# Patient Record
Sex: MALE | Race: WHITE | NOT HISPANIC OR LATINO | Employment: OTHER | URBAN - NONMETROPOLITAN AREA
[De-identification: names, ages, dates, MRNs, and addresses within clinical notes are randomized per-mention and may not be internally consistent; named-entity substitution may affect disease eponyms.]

---

## 2017-03-29 ENCOUNTER — OFFICE VISIT (OUTPATIENT)
Dept: CARDIOLOGY | Facility: CLINIC | Age: 82
End: 2017-03-29

## 2017-03-29 VITALS
WEIGHT: 198 LBS | HEART RATE: 84 BPM | SYSTOLIC BLOOD PRESSURE: 130 MMHG | DIASTOLIC BLOOD PRESSURE: 72 MMHG | BODY MASS INDEX: 30.01 KG/M2 | HEIGHT: 68 IN

## 2017-03-29 DIAGNOSIS — Z95.5 HISTORY OF PLACEMENT OF STENT IN LAD CORONARY ARTERY: ICD-10-CM

## 2017-03-29 DIAGNOSIS — I25.10 CORONARY ARTERY DISEASE INVOLVING NATIVE CORONARY ARTERY OF NATIVE HEART WITHOUT ANGINA PECTORIS: Primary | ICD-10-CM

## 2017-03-29 DIAGNOSIS — I10 ESSENTIAL HYPERTENSION: ICD-10-CM

## 2017-03-29 DIAGNOSIS — E78.00 PURE HYPERCHOLESTEROLEMIA: ICD-10-CM

## 2017-03-29 PROCEDURE — 99213 OFFICE O/P EST LOW 20 MIN: CPT | Performed by: NURSE PRACTITIONER

## 2017-03-29 RX ORDER — OMEPRAZOLE 20 MG/1
20 CAPSULE, DELAYED RELEASE ORAL DAILY
COMMUNITY
End: 2017-10-10 | Stop reason: ALTCHOICE

## 2017-03-29 NOTE — PROGRESS NOTES
Chief Complaint   Patient presents with   • Follow-up     6 month follow up, denies cardiac symptoms, reports [facility],        Subjective       Mamadou Granger is a 81 y.o. male with a history of ischemic heart disease diagnosed in 2008 when he underwent stenting of the LAD. In 2014, he started following with Dr. Santos. Stress done at that time showed no ischemic burden and lisinopril was added. He historically has been intolerant to beta blockers from marked bradycardia. Today he comes to the office and no complaints are voiced. He maintains his normal activities without symptoms.     HPI         Cardiac History:    Past Surgical History:   Procedure Laterality Date   • CARDIOVASCULAR STRESS TEST  05/19/2014    Stress- 4 min, 97% THR. 164/84. negative for ischemia, Lisinopril added   • ECHO - CONVERTED  05/19/2014    Echo- EF 65%, RVSP- 35 mmHg   • OTHER SURGICAL HISTORY  07/23/2008    3.0x28 Taxus in LAD       Current Outpatient Prescriptions   Medication Sig Dispense Refill   • alfuzosin (UROXATRAL) 10 MG 24 hr tablet Take 10 mg by mouth every evening.     • aspirin 81 MG EC tablet Take 81 mg by mouth daily.     • atorvastatin (LIPITOR) 20 MG tablet Take 10 mg by mouth every night.     • finasteride (PROSCAR) 5 MG tablet Take 5 mg by mouth daily.     • mirtazapine (REMERON) 30 MG tablet Take 30 mg by mouth every night.     • omeprazole (priLOSEC) 20 MG capsule Take 20 mg by mouth Daily.       No current facility-administered medications for this visit.        Review of patient's allergies indicates no known allergies.    Past Medical History:   Diagnosis Date   • Arthritis    • BPH (benign prostatic hyperplasia)    • CAD (coronary artery disease)     s/p stenting   • Hypercholesteremia    • Hypertension        Social History     Social History   • Marital status:      Spouse name: N/A   • Number of children: N/A   • Years of education: N/A     Occupational History   • Not on  "file.     Social History Main Topics   • Smoking status: Never Smoker   • Smokeless tobacco: Never Used   • Alcohol use No   • Drug use: No   • Sexual activity: Not on file     Other Topics Concern   • Not on file     Social History Narrative       Family History   Problem Relation Age of Onset   • No Known Problems Mother    • Stroke Father    • Stroke Other        Review of Systems   Constitutional: Negative.    HENT: Negative.    Respiratory: Positive for shortness of breath (only with exertion, not a new symptom). Negative for cough.    Cardiovascular: Negative.    Gastrointestinal: Negative.    Musculoskeletal: Negative for gait problem and myalgias.   Skin: Negative.    Neurological: Negative.    Hematological: Negative.    Psychiatric/Behavioral: Negative.        Diabetes- No  Thyroid-normal    Objective     /72 (BP Location: Left arm)  Pulse 84  Ht 68\" (172.7 cm)  Wt 198 lb (89.8 kg)  BMI 30.11 kg/m2    Physical Exam   Constitutional: He is oriented to person, place, and time. Vital signs are normal.   Eyes: Pupils are equal, round, and reactive to light.   Neck: Neck supple. Carotid bruit is not present. No edema present.   Cardiovascular: Normal rate, regular rhythm, S1 normal, S2 normal and normal pulses.    Pulmonary/Chest: Effort normal and breath sounds normal.   Abdominal: Soft. Bowel sounds are normal.   Neurological: He is alert and oriented to person, place, and time.   Skin: Skin is warm and dry.   Psychiatric: He has a normal mood and affect. His behavior is normal. Judgment and thought content normal.   Vitals reviewed.    Procedures        Assessment/Plan      Mamadou was seen today for follow-up.    Diagnoses and all orders for this visit:    Coronary artery disease involving native coronary artery of native heart without angina pectoris    History of placement of stent in LAD coronary artery    Pure hypercholesterolemia    Essential hypertension        At next visit will consider " repeat stress test due to being over 3 years and history of LAD stent. Currently, he remains asymptomatic and vital signs are stable. No medication changes made. [facility] and [facility]. We will see him in 6 months or sooner for problems.            Electronically signed by AIDA Valladares,  March 29, 2017 3:05 PM

## 2017-10-10 ENCOUNTER — OFFICE VISIT (OUTPATIENT)
Dept: CARDIOLOGY | Facility: CLINIC | Age: 82
End: 2017-10-10

## 2017-10-10 VITALS
BODY MASS INDEX: 29.55 KG/M2 | HEART RATE: 80 BPM | DIASTOLIC BLOOD PRESSURE: 80 MMHG | WEIGHT: 195 LBS | HEIGHT: 68 IN | SYSTOLIC BLOOD PRESSURE: 142 MMHG

## 2017-10-10 DIAGNOSIS — I25.10 CORONARY ARTERY DISEASE INVOLVING NATIVE CORONARY ARTERY OF NATIVE HEART WITHOUT ANGINA PECTORIS: ICD-10-CM

## 2017-10-10 DIAGNOSIS — R06.02 SHORTNESS OF BREATH: ICD-10-CM

## 2017-10-10 DIAGNOSIS — I10 ESSENTIAL HYPERTENSION: Primary | ICD-10-CM

## 2017-10-10 DIAGNOSIS — Z95.5 HISTORY OF PLACEMENT OF STENT IN LAD CORONARY ARTERY: ICD-10-CM

## 2017-10-10 DIAGNOSIS — E78.49 OTHER HYPERLIPIDEMIA: ICD-10-CM

## 2017-10-10 PROCEDURE — 99214 OFFICE O/P EST MOD 30 MIN: CPT | Performed by: NURSE PRACTITIONER

## 2017-10-10 NOTE — PROGRESS NOTES
Chief Complaint   Patient presents with   • Follow-up     For cardiac management.    • Med Refill     [facility]        Cardiac Complaints  none      Subjective   Mamadou Granger is a 82 y.o. male with HTN, hyperlipidemia, and  ischemic heart disease diagnosed in 2008 when he underwent stenting of the LAD. In 2014, he started following with Dr. Santos. Stress done at that time showed no ischemic burden and lisinopril was added. He historically has been intolerant to beta blockers from marked bradycardia. He returns today for follow up and denies any cardiac concerns.  He denies any chest pain, palpitations, or shortness of breath.  [facility], he will follow with them again soon in regards.  No refills are currently needed as they are [facility] as [facility].  He does report walking most days at [employer] where he says he laps around the store about 2 times without concerns.  If he overdoes it, he states he will become slightly winded but with just everyday activity, he feels like he does very well. 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          Cardiac History  Past Surgical History:   Procedure Laterality Date   • CARDIOVASCULAR STRESS TEST  05/19/2014    Stress- 4 min, 97% THR. 164/84. negative for ischemia, Lisinopril added   • ECHO - CONVERTED  05/19/2014    Echo- EF 65%, RVSP- 35 mmHg   • OTHER SURGICAL HISTORY  07/23/2008    3.0x28 Taxus in LAD       Current Outpatient Prescriptions   Medication Sig Dispense Refill   • alfuzosin (UROXATRAL) 10 MG 24 hr tablet Take 10 mg by mouth every evening.     • aspirin 81 MG EC tablet Take 81 mg by mouth daily.     • atorvastatin (LIPITOR) 20 MG tablet Take 10 mg by mouth every night.     • finasteride (PROSCAR) 5 MG tablet Take 5 mg by mouth daily.     • mirtazapine (REMERON) 30 MG tablet Take 30 mg by mouth every night.       No current facility-administered medications for this visit.        Review of patient's  "allergies indicates no known allergies.    Past Medical History:   Diagnosis Date   • Arthritis    • BPH (benign prostatic hyperplasia)    • CAD (coronary artery disease)     s/p stenting   • Hypercholesteremia    • Hypertension        Social History     Social History   • Marital status:      Spouse name: N/A   • Number of children: N/A   • Years of education: N/A     Occupational History   • Not on file.     Social History Main Topics   • Smoking status: Never Smoker   • Smokeless tobacco: Never Used   • Alcohol use No   • Drug use: No   • Sexual activity: Not on file     Other Topics Concern   • Not on file     Social History Narrative       Family History   Problem Relation Age of Onset   • No Known Problems Mother    • Stroke Father    • Stroke Other        Review of Systems   Constitution: Negative for weakness and malaise/fatigue.   Cardiovascular: Negative for chest pain, dyspnea on exertion, near-syncope and syncope.   Respiratory: Negative for shortness of breath and wheezing.    Musculoskeletal: Negative for arthritis and back pain.   Gastrointestinal: Negative for anorexia, heartburn and nausea.   Genitourinary: Negative for dysuria, hesitancy and nocturia.   Neurological: Negative for dizziness, light-headedness, loss of balance and numbness.   Psychiatric/Behavioral: Negative for altered mental status and depression.       DiabetesNo  Thyroidnormal    Objective     /80 (BP Location: Right arm)  Pulse 80  Ht 68\" (172.7 cm)  Wt 195 lb (88.5 kg)  BMI 29.65 kg/m2    Physical Exam   Constitutional: He is oriented to person, place, and time. He appears well-developed and well-nourished.   HENT:   Head: Normocephalic and atraumatic.   Eyes: EOM are normal. Pupils are equal, round, and reactive to light.   Neck: Normal range of motion. Neck supple.   Cardiovascular: Normal rate and regular rhythm.    Murmur heard.  Pulmonary/Chest: Effort normal and breath sounds normal.   Abdominal: Soft. "   Musculoskeletal: Normal range of motion.   Neurological: He is alert and oriented to person, place, and time.   Skin: Skin is warm and dry.   Psychiatric: He has a normal mood and affect. His behavior is normal.       Procedures    Assessment/Plan     HR and BP are stable today.  No changes to current regimen will be advised.  Since stent present in LAD, and length of time since last cardiac workup, repeat stress and echo will be advised to evaluate cardiac status.  More recommendations to follow.  [facility], patient will bring copy to our office for review.  No refills are needed at present as they are also done with [facility].  Patient is staying active at home and walks most days without concern, he was advised to continue.  Good cardiac diet with limited caloric intake advised.  6 month follow up advised or sooner if needed.      Problems Addressed this Visit        Cardiovascular and Mediastinum    Essential hypertension - Primary    Hyperlipidemia    CAD (coronary artery disease)    Relevant Orders    Stress Test With Myocardial Perfusion One Day    Adult Transthoracic Echo Complete W/ Cont if Necessary Per Protocol       Other    History of placement of stent in LAD coronary artery    Relevant Orders    Stress Test With Myocardial Perfusion One Day    Adult Transthoracic Echo Complete W/ Cont if Necessary Per Protocol      Other Visit Diagnoses     Shortness of breath        Relevant Orders    Stress Test With Myocardial Perfusion One Day    Adult Transthoracic Echo Complete W/ Cont if Necessary Per Protocol              Electronically signed by AIDA Banegas October 10, 2017 4:32 PM

## 2017-10-18 ENCOUNTER — HOSPITAL ENCOUNTER (OUTPATIENT)
Dept: CARDIOLOGY | Facility: HOSPITAL | Age: 82
Discharge: HOME | End: 2017-10-18

## 2017-10-18 ENCOUNTER — OUTSIDE FACILITY SERVICE (OUTPATIENT)
Dept: CARDIOLOGY | Facility: CLINIC | Age: 82
End: 2017-10-18

## 2017-10-18 DIAGNOSIS — Z95.5 HISTORY OF PLACEMENT OF STENT IN LAD CORONARY ARTERY: ICD-10-CM

## 2017-10-18 DIAGNOSIS — R06.02 SHORTNESS OF BREATH: ICD-10-CM

## 2017-10-18 DIAGNOSIS — I25.10 CORONARY ARTERY DISEASE INVOLVING NATIVE CORONARY ARTERY OF NATIVE HEART WITHOUT ANGINA PECTORIS: ICD-10-CM

## 2017-10-18 LAB
MAXIMAL PREDICTED HEART RATE: 140 BPM
STRESS TARGET HR: 119 BPM

## 2017-10-18 PROCEDURE — A9500 TC99M SESTAMIBI: HCPCS | Performed by: INTERNAL MEDICINE

## 2017-10-18 PROCEDURE — 0 TECHNETIUM SESTAMIBI: Performed by: INTERNAL MEDICINE

## 2017-10-18 PROCEDURE — 78452 HT MUSCLE IMAGE SPECT MULT: CPT

## 2017-10-18 PROCEDURE — 93306 TTE W/DOPPLER COMPLETE: CPT

## 2017-10-18 PROCEDURE — 93017 CV STRESS TEST TRACING ONLY: CPT

## 2017-10-18 RX ADMIN — TECHNETIUM TC-99M SESTAMIBI 1 DOSE: 1 INJECTION INTRAVENOUS at 08:15

## 2017-10-23 ENCOUNTER — TELEPHONE (OUTPATIENT)
Dept: CARDIOLOGY | Facility: CLINIC | Age: 82
End: 2017-10-23

## 2017-10-23 RX ORDER — CARVEDILOL 3.12 MG/1
3.12 TABLET ORAL 2 TIMES DAILY
Qty: 180 TABLET | Refills: 3 | Status: SHIPPED | OUTPATIENT
Start: 2017-10-23 | End: 2018-10-17 | Stop reason: SDUPTHER

## 2017-10-23 NOTE — TELEPHONE ENCOUNTER
I spoke with Cassy Granger.  Aware of patient's stress test and echo results and recommendations.  No definite ischemia, normal LV function.  Add Coreg 3.125 mg BID.  Script sent to [facility].

## 2018-04-11 ENCOUNTER — OFFICE VISIT (OUTPATIENT)
Dept: CARDIOLOGY | Facility: CLINIC | Age: 83
End: 2018-04-11

## 2018-04-11 VITALS
HEART RATE: 72 BPM | WEIGHT: 197 LBS | DIASTOLIC BLOOD PRESSURE: 86 MMHG | BODY MASS INDEX: 29.86 KG/M2 | SYSTOLIC BLOOD PRESSURE: 132 MMHG | HEIGHT: 68 IN

## 2018-04-11 DIAGNOSIS — I25.10 CORONARY ARTERY DISEASE INVOLVING NATIVE CORONARY ARTERY OF NATIVE HEART WITHOUT ANGINA PECTORIS: Primary | ICD-10-CM

## 2018-04-11 DIAGNOSIS — E78.00 PURE HYPERCHOLESTEROLEMIA: ICD-10-CM

## 2018-04-11 DIAGNOSIS — I10 ESSENTIAL HYPERTENSION: ICD-10-CM

## 2018-04-11 DIAGNOSIS — Z95.5 HISTORY OF PLACEMENT OF STENT IN LAD CORONARY ARTERY: ICD-10-CM

## 2018-04-11 PROCEDURE — 99213 OFFICE O/P EST LOW 20 MIN: CPT | Performed by: NURSE PRACTITIONER

## 2018-04-11 NOTE — PROGRESS NOTES
Chief Complaint   Patient presents with   • Follow-up     cardiac management, no recent labs. Patient reports labs and [facility] per [facility]. Patient did not bring in medication list with visit, states nothing has changed.        Subjective       Mamadou Granger is a 82 y.o. male  with HTN, hyperlipidemia, and  ischemic heart disease diagnosed in 2008 when he underwent stenting of the LAD. In 2014, he started following with Dr. Santos. Stress done at that time showed no ischemic burden and lisinopril was added. He historically has been intolerant to beta blockers from marked bradycardia.   IN October 2017, repeat cardiac workup advised due to increased shortness of breath and decreased effort tolerance. No ischemia was noted. He did have increased chronotropic response and mild increased blood pressure response. A low dose of Coreg was advised.   Today he comes to the office for a follow up visit and denies chest pain, palpitations or significant shortness of breath. He reports doing well since addition of Coreg.     HPI : as noted     Cardiac History:    Past Surgical History:   Procedure Laterality Date   • CARDIOVASCULAR STRESS TEST  05/19/2014    Stress- 4 min, 97% THR. 164/84. negative for ischemia, Lisinopril added   • CARDIOVASCULAR STRESS TEST  10/18/2017    3 Min,11 Secs. 98% THR. BP- 173/82. Negative   • CARDIOVASCULAR STRESS TEST  10/18/2017    3 min 11 sec, 98% THR, 173/82, no definite ischemia, low dose Coreg added   • ECHO - CONVERTED  05/19/2014    Echo- EF 65%, RVSP- 35 mmHg   • ECHO - CONVERTED  10/18/2017    EF 65%. RVSP- 34 mmHg   • ECHO - CONVERTED  10/18/2017    EF 65%, mild MR, RVSP 34 mmHg   • OTHER SURGICAL HISTORY  07/23/2008    3.0x28 Taxus in LAD       Current Outpatient Prescriptions   Medication Sig Dispense Refill   • alfuzosin (UROXATRAL) 10 MG 24 hr tablet Take 10 mg by mouth every evening.     • aspirin 81 MG EC tablet Take 81 mg by mouth daily.     • atorvastatin (LIPITOR) 20 MG  tablet Take 10 mg by mouth every night.     • carvedilol (COREG) 3.125 MG tablet Take 1 tablet by mouth 2 (Two) Times a Day. 180 tablet 3   • finasteride (PROSCAR) 5 MG tablet Take 5 mg by mouth daily.     • mirtazapine (REMERON) 30 MG tablet Take 30 mg by mouth every night.       No current facility-administered medications for this visit.        Review of patient's allergies indicates no known allergies.    Past Medical History:   Diagnosis Date   • Arthritis    • BPH (benign prostatic hyperplasia)    • CAD (coronary artery disease)     s/p stenting   • Hypercholesteremia    • Hypertension        Social History     Social History   • Marital status:      Spouse name: N/A   • Number of children: N/A   • Years of education: N/A     Occupational History   • Not on file.     Social History Main Topics   • Smoking status: Never Smoker   • Smokeless tobacco: Never Used   • Alcohol use No   • Drug use: No   • Sexual activity: Defer     Other Topics Concern   • Not on file     Social History Narrative   • No narrative on file       Family History   Problem Relation Age of Onset   • No Known Problems Mother    • Stroke Father    • Stroke Other        Review of Systems   Constitution: Negative for decreased appetite and malaise/fatigue.   HENT: Positive for hearing loss. Negative for congestion, hoarse voice, nosebleeds and sore throat.    Eyes: Negative for blurred vision.   Respiratory: Negative for shortness of breath and snoring.    Endocrine: Negative for cold intolerance and heat intolerance.   Hematologic/Lymphatic: Negative for adenopathy. Does not bruise/bleed easily.   Skin: Negative for color change, dry skin and itching.   Musculoskeletal: Negative for joint pain, muscle cramps and myalgias.   Gastrointestinal: Negative for abdominal pain, change in bowel habit, dysphagia, heartburn, melena and nausea.   Genitourinary: Negative for dysuria and hematuria.   Neurological: Negative for dizziness and  "light-headedness.   Psychiatric/Behavioral: Negative for altered mental status. The patient does not have insomnia.    Allergic/Immunologic: Negative for environmental allergies and hives.        Objective     /86 (BP Location: Left arm)   Pulse 72   Ht 172.7 cm (68\")   Wt 89.4 kg (197 lb)   BMI 29.95 kg/m²     Physical Exam   Constitutional: He is oriented to person, place, and time. He appears well-nourished.   HENT:   Head: Normocephalic.   Eyes: Pupils are equal, round, and reactive to light.   Neck: Normal range of motion.   Cardiovascular: Normal rate, regular rhythm, S1 normal and S2 normal.    No murmur heard.  Pulmonary/Chest: Breath sounds normal.   Abdominal: Soft. Bowel sounds are normal.   Musculoskeletal: Normal range of motion.   Neurological: He is alert and oriented to person, place, and time.   Skin: Skin is warm.   Psychiatric: He has a normal mood and affect.        Procedures: none today        Assessment/Plan      Mamadou was seen today for follow-up.    Diagnoses and all orders for this visit:    Coronary artery disease involving native coronary artery of native heart without angina pectoris    Essential hypertension    Pure hypercholesterolemia    History of placement of stent in LAD coronary artery    The reports of his most recent stress test and echocardiogram were reviewed which were negative for ischemia. His vital signs are in normal range and tolerating beta blocker well at this time. Advised to continue same cardiac medications.   [facility] reviewed which did show glucose 125. Body mass index is 29.95 kg/m². Patient's Body mass index is 29.95 kg/m². BMI is above normal parameters. Follow-up plan includes:  nutrition counseling. Diabetic diet information given. Low impact daily cardio activity encouraged.            Electronically signed by AIDA Valladares,  April 11, 2018 9:57 AM  "

## 2018-04-11 NOTE — PATIENT INSTRUCTIONS
Diabetes Mellitus and Food  It is important for you to manage your blood sugar (glucose) level. Your blood glucose level can be greatly affected by what you eat. Eating healthier foods in the appropriate amounts throughout the day at about the same time each day will help you control your blood glucose level. It can also help slow or prevent worsening of your diabetes mellitus. Healthy eating may even help you improve the level of your blood pressure and reach or maintain a healthy weight.  General recommendations for healthful eating and cooking habits include:  · Eating meals and snacks regularly. Avoid going long periods of time without eating to lose weight.  · Eating a diet that consists mainly of plant-based foods, such as fruits, vegetables, nuts, legumes, and whole grains.  · Using low-heat cooking methods, such as baking, instead of high-heat cooking methods, such as deep frying.  Work with your dietitian to make sure you understand how to use the Nutrition Facts information on food labels.  How can food affect me?  Carbohydrates   Carbohydrates affect your blood glucose level more than any other type of food. Your dietitian will help you determine how many carbohydrates to eat at each meal and teach you how to count carbohydrates. Counting carbohydrates is important to keep your blood glucose at a healthy level, especially if you are using insulin or taking certain medicines for diabetes mellitus.  Alcohol   Alcohol can cause sudden decreases in blood glucose (hypoglycemia), especially if you use insulin or take certain medicines for diabetes mellitus. Hypoglycemia can be a life-threatening condition. Symptoms of hypoglycemia (sleepiness, dizziness, and disorientation) are similar to symptoms of having too much alcohol.  If your health care provider has given you approval to drink alcohol, do so in moderation and use the following guidelines:  · Women should not have more than one drink per day, and men  should not have more than two drinks per day. One drink is equal to:  ¨ 12 oz of beer.  ¨ 5 oz of wine.  ¨ 1½ oz of hard liquor.  · Do not drink on an empty stomach.  · Keep yourself hydrated. Have water, diet soda, or unsweetened iced tea.  · Regular soda, juice, and other mixers might contain a lot of carbohydrates and should be counted.  What foods are not recommended?  As you make food choices, it is important to remember that all foods are not the same. Some foods have fewer nutrients per serving than other foods, even though they might have the same number of calories or carbohydrates. It is difficult to get your body what it needs when you eat foods with fewer nutrients. Examples of foods that you should avoid that are high in calories and carbohydrates but low in nutrients include:  · Trans fats (most processed foods list trans fats on the Nutrition Facts label).  · Regular soda.  · Juice.  · Candy.  · Sweets, such as cake, pie, doughnuts, and cookies.  · Fried foods.  What foods can I eat?  Eat nutrient-rich foods, which will nourish your body and keep you healthy. The food you should eat also will depend on several factors, including:  · The calories you need.  · The medicines you take.  · Your weight.  · Your blood glucose level.  · Your blood pressure level.  · Your cholesterol level.  You should eat a variety of foods, including:  · Protein.  ¨ Lean cuts of meat.  ¨ Proteins low in saturated fats, such as fish, egg whites, and beans. Avoid processed meats.  · Fruits and vegetables.  ¨ Fruits and vegetables that may help control blood glucose levels, such as apples, mangoes, and yams.  · Dairy products.  ¨ Choose fat-free or low-fat dairy products, such as milk, yogurt, and cheese.  · Grains, bread, pasta, and rice.  ¨ Choose whole grain products, such as multigrain bread, whole oats, and brown rice. These foods may help control blood pressure.  · Fats.  ¨ Foods containing healthful fats, such as nuts,  avocado, olive oil, canola oil, and fish.  Does everyone with diabetes mellitus have the same meal plan?  Because every person with diabetes mellitus is different, there is not one meal plan that works for everyone. It is very important that you meet with a dietitian who will help you create a meal plan that is just right for you.  This information is not intended to replace advice given to you by your health care provider. Make sure you discuss any questions you have with your health care provider.  Document Released: 09/14/2006 Document Revised: 05/25/2017 Document Reviewed: 11/14/2014  [employer] Interactive Patient Education © 2017 [employer]

## 2018-10-17 RX ORDER — CARVEDILOL 3.12 MG/1
TABLET ORAL
Qty: 60 TABLET | Refills: 2 | Status: SHIPPED | OUTPATIENT
Start: 2018-10-17 | End: 2019-01-14 | Stop reason: SDUPTHER

## 2018-10-30 ENCOUNTER — OFFICE VISIT (OUTPATIENT)
Dept: CARDIOLOGY | Facility: CLINIC | Age: 83
End: 2018-10-30

## 2018-10-30 VITALS
DIASTOLIC BLOOD PRESSURE: 80 MMHG | HEIGHT: 68 IN | HEART RATE: 64 BPM | WEIGHT: 189 LBS | SYSTOLIC BLOOD PRESSURE: 124 MMHG | BODY MASS INDEX: 28.64 KG/M2

## 2018-10-30 DIAGNOSIS — E78.00 PURE HYPERCHOLESTEROLEMIA: ICD-10-CM

## 2018-10-30 DIAGNOSIS — I25.10 CORONARY ARTERY DISEASE INVOLVING NATIVE CORONARY ARTERY OF NATIVE HEART WITHOUT ANGINA PECTORIS: Primary | ICD-10-CM

## 2018-10-30 DIAGNOSIS — Z95.5 HISTORY OF PLACEMENT OF STENT IN LAD CORONARY ARTERY: ICD-10-CM

## 2018-10-30 DIAGNOSIS — I10 ESSENTIAL HYPERTENSION: ICD-10-CM

## 2018-10-30 PROCEDURE — 99213 OFFICE O/P EST LOW 20 MIN: CPT | Performed by: NURSE PRACTITIONER

## 2018-10-30 RX ORDER — TAMSULOSIN HYDROCHLORIDE 0.4 MG/1
1 CAPSULE ORAL NIGHTLY
COMMUNITY
End: 2020-06-02

## 2018-10-30 NOTE — PROGRESS NOTES
Chief Complaint   Patient presents with   • Follow-up     Cardiac management. [facility]. [facility].       Subjective       Mamadou Granger is an 83 y.o. male with HTN, hyperlipidemia, and  ischemic heart disease diagnosed in 2008 when he underwent stenting of the LAD. In 2014, he started following with Dr. Santos. Stress done at that time showed no ischemic burden and lisinopril was added. He historically has been intolerant to beta blockers from marked bradycardia. In October 2017, repeat cardiac workup advised due to increased shortness of breath and decreased effort tolerance. No ischemia was noted. He did have increased chronotropic response and mild increased blood pressure response. A low dose of Coreg was advised. He came in today for follow up. He feels well. Denies CP, SOB, palpitations. [facility]. He brought copy today showing , Tri 113, LDL 95, HDL 35, A1C 6.4%, CMP normal other than glucose 148. He remains prediabetic. He is walking daily, two rounds around [employer] and one around [place]. [facility] via  if approved.     HPI         Cardiac History:    Past Surgical History:   Procedure Laterality Date   • CARDIAC CATHETERIZATION  07/23/2008    3.0x28 Taxus in LAD   • CARDIOVASCULAR STRESS TEST  05/19/2014    Stress- 4 min, 97% THR. 164/84. negative for ischemia, Lisinopril added   • CARDIOVASCULAR STRESS TEST  10/18/2017    3 Min,11 Secs. 98% THR. BP- 173/82. Negative   • CARDIOVASCULAR STRESS TEST  10/18/2017    3 min 11 sec, 98% THR, 173/82, no definite ischemia, low dose Coreg added   • ECHO - CONVERTED  05/19/2014    Echo- EF 65%, RVSP- 35 mmHg   • ECHO - CONVERTED  10/18/2017    EF 65%. RVSP- 34 mmHg   • ECHO - CONVERTED  10/18/2017    EF 65%, mild MR, RVSP 34 mmHg       Current Outpatient Prescriptions   Medication Sig Dispense Refill   • aspirin 81 MG EC tablet Take 81 mg by mouth daily.      • atorvastatin (LIPITOR) 20 MG tablet Take 10 mg by mouth every night.     • carvedilol (COREG) 3.125 MG tablet TAKE ONE TABLET BY MOUTH TWICE A DAY 60 tablet 2   • finasteride (PROSCAR) 5 MG tablet Take 5 mg by mouth daily.     • mirtazapine (REMERON) 30 MG tablet Take 30 mg by mouth every night.     • tamsulosin (FLOMAX) 0.4 MG capsule 24 hr capsule Take 1 capsule by mouth Every Night.       No current facility-administered medications for this visit.        Patient has no known allergies.    Past Medical History:   Diagnosis Date   • Arthritis    • BPH (benign prostatic hyperplasia)    • CAD (coronary artery disease)     s/p stenting   • Hypercholesteremia    • Hypertension        Social History     Social History   • Marital status:      Spouse name: N/A   • Number of children: N/A   • Years of education: N/A     Occupational History   • Not on file.     Social History Main Topics   • Smoking status: Never Smoker   • Smokeless tobacco: Never Used   • Alcohol use No   • Drug use: No   • Sexual activity: Defer     Other Topics Concern   • Not on file     Social History Narrative   • No narrative on file       Family History   Problem Relation Age of Onset   • No Known Problems Mother    • Stroke Father    • Stroke Other        Review of Systems   Constitution: Positive for weight loss (down 8 lb ). Negative for decreased appetite, weakness and malaise/fatigue.   HENT: Positive for hearing loss.    Eyes: Negative.    Cardiovascular: Negative for chest pain, dyspnea on exertion, leg swelling, palpitations and syncope.   Respiratory: Negative for cough and shortness of breath.    Endocrine: Negative.    Hematologic/Lymphatic: Negative.    Skin: Negative.    Musculoskeletal: Negative for falls and myalgias.   Gastrointestinal: Positive for constipation. Negative for abdominal pain and melena.   Genitourinary: Negative for dysuria and hematuria.   Neurological: Negative for dizziness.   Psychiatric/Behavioral:  "Negative for altered mental status and depression.   Allergic/Immunologic: Negative.         Diabetes- No, prediabetes, A1C 6.4%  Thyroid-normal    Objective     /80 (BP Location: Left arm)   Pulse 64   Ht 172.7 cm (67.99\")   Wt 85.7 kg (189 lb)   BMI 28.74 kg/m²     Physical Exam   Constitutional: He is oriented to person, place, and time. He appears well-developed and well-nourished. No distress.   HENT:   Head: Normocephalic.   Eyes: Pupils are equal, round, and reactive to light.   Neck: Normal range of motion.   Cardiovascular: Normal rate, regular rhythm and intact distal pulses.    Pulmonary/Chest: Effort normal and breath sounds normal. No respiratory distress. He has no wheezes.   Abdominal: Soft. Bowel sounds are normal. He exhibits no distension.   Musculoskeletal: Normal range of motion. He exhibits no edema.   Neurological: He is alert and oriented to person, place, and time.   Skin: Skin is warm. He is not diaphoretic.   Psychiatric: He has a normal mood and affect.   Nursing note and vitals reviewed.     Procedures          Assessment/Plan    Heart rate and blood pressure well controlled. Reviewed most recent stress and echo showing no ischemia. He is tolerating Coreg without bradycardia. We reviewed labs together which show blood sugar remains borderline. We discussed dietary management with decreasing carbohydrates and sugars. Continue daily walking. Lipids are stable with LDL 95. Continue Lipitor 20 mg. Advised to limit saturated fats. He remains asymptomatic, so continue conservative management. No testing ordered today. We will see him back in six months or sooner if needed.    Mamadou was seen today for follow-up.    Diagnoses and all orders for this visit:    Coronary artery disease involving native coronary artery of native heart without angina pectoris    Essential hypertension    Pure hypercholesterolemia    History of placement of stent in LAD coronary artery        Patient's Body " mass index is 28.74 kg/m². BMI is above normal parameters. Recommendations include: nutrition counseling.               Electronically signed by AIDA Lazcano,  October 30, 2018 12:41 PM

## 2019-01-10 ENCOUNTER — TELEPHONE (OUTPATIENT)
Dept: CARDIOLOGY | Facility: CLINIC | Age: 84
End: 2019-01-10

## 2019-01-10 NOTE — TELEPHONE ENCOUNTER
Received fax from Dr. Dante Villarreal for cardiac clearance for patient to have a cochlear implant and will be under 3 hour general anesthesia. Last stent was done on 07/23/08. They are not requesting to hold any medications at this time.     Fax [phone]  Phone [phone]

## 2019-01-14 RX ORDER — CARVEDILOL 3.12 MG/1
TABLET ORAL
Qty: 60 TABLET | Refills: 11 | Status: SHIPPED | OUTPATIENT
Start: 2019-01-14 | End: 2019-11-21 | Stop reason: SDUPTHER

## 2019-04-30 ENCOUNTER — OFFICE VISIT (OUTPATIENT)
Dept: CARDIOLOGY | Facility: CLINIC | Age: 84
End: 2019-04-30

## 2019-04-30 VITALS
WEIGHT: 184 LBS | SYSTOLIC BLOOD PRESSURE: 120 MMHG | HEART RATE: 64 BPM | DIASTOLIC BLOOD PRESSURE: 74 MMHG | HEIGHT: 68 IN | BODY MASS INDEX: 27.89 KG/M2

## 2019-04-30 DIAGNOSIS — H91.8X3 OTHER SPECIFIED HEARING LOSS OF BOTH EARS: ICD-10-CM

## 2019-04-30 DIAGNOSIS — E66.3 OVERWEIGHT: ICD-10-CM

## 2019-04-30 DIAGNOSIS — E78.2 MIXED HYPERLIPIDEMIA: ICD-10-CM

## 2019-04-30 DIAGNOSIS — Z95.5 HISTORY OF PLACEMENT OF STENT IN LAD CORONARY ARTERY: ICD-10-CM

## 2019-04-30 DIAGNOSIS — I10 ESSENTIAL HYPERTENSION: Primary | ICD-10-CM

## 2019-04-30 DIAGNOSIS — I25.10 CORONARY ARTERY DISEASE INVOLVING NATIVE CORONARY ARTERY OF NATIVE HEART WITHOUT ANGINA PECTORIS: ICD-10-CM

## 2019-04-30 PROCEDURE — 99213 OFFICE O/P EST LOW 20 MIN: CPT | Performed by: NURSE PRACTITIONER

## 2019-04-30 NOTE — PROGRESS NOTES
Chief Complaint   Patient presents with   • Follow-up     For cardiac management. Patient is on aspirin. Last [facility] work was [facility], in [facility] under [facility].   • Med Refill     [facility]. Brought medication list with visit.        Cardiac Complaints  none      Subjective   Mamadou Granger is a 83 y.o. male with HTN, hyperlipidemia, and  ischemic heart disease diagnosed in 2008 when he underwent stenting of the LAD. In 2014, he started following with Dr. Santos. Stress done at that time showed no ischemic burden and lisinopril was added. He historically has been intolerant to beta blockers from marked bradycardia. In October 2017, repeat cardiac workup advised due to increased shortness of breath and decreased effort tolerance. No ischemia was noted. He did have increased chronotropic response and mild increased blood pressure response. A low dose of Coreg was advised.  Patient returns today for follow up and reports doing well. He does report [facility] and were last checked in October.  Labs done at that time showed H/H 14.5/43.1, Na 138, K 4.4, creatinine 0.84, GFR 60, AST 37, ALT 45, LDL 95, and HDL 35.  Patient admits to a left cochlear implant at  and admits to healing well.  He states the implant will be turned on the 16th of May.  Patient continues to wear hearing aid in the right ear.  He denies chest pain, shortness of breath, palpitations, dizziness, syncope, and pre-syncope.  No refills needed as he reports with [facility].        Cardiac History  Past Surgical History:   Procedure Laterality Date   • CARDIAC CATHETERIZATION  07/23/2008    3.0x28 Taxus in LAD   • CARDIOVASCULAR STRESS TEST  05/19/2014    Stress- 4 min, 97% THR. 164/84. negative for ischemia, Lisinopril added   • CARDIOVASCULAR STRESS TEST  10/18/2017    3 Min,11 Secs. 98% THR. BP- 173/82. Negative   • CARDIOVASCULAR STRESS TEST  10/18/2017    3 min 11 sec, 98% THR, 173/82, no definite ischemia,  low dose Coreg added   • ECHO - CONVERTED  05/19/2014    Echo- EF 65%, RVSP- 35 mmHg   • ECHO - CONVERTED  10/18/2017    EF 65%. RVSP- 34 mmHg   • ECHO - CONVERTED  10/18/2017    EF 65%, mild MR, RVSP 34 mmHg       Current Outpatient Medications   Medication Sig Dispense Refill   • aspirin 81 MG EC tablet Take 81 mg by mouth daily.     • carvedilol (COREG) 3.125 MG tablet TAKE ONE TABLET BY MOUTH TWICE A DAY 60 tablet 11   • finasteride (PROSCAR) 5 MG tablet Take 5 mg by mouth daily.     • mirtazapine (REMERON) 30 MG tablet Take 30 mg by mouth every night.     • tamsulosin (FLOMAX) 0.4 MG capsule 24 hr capsule Take 1 capsule by mouth Every Night.       No current facility-administered medications for this visit.        Patient has no known allergies.    Past Medical History:   Diagnosis Date   • Arthritis    • BPH (benign prostatic hyperplasia)    • CAD (coronary artery disease)     s/p stenting   • Hypercholesteremia    • Hypertension        Social History     Socioeconomic History   • Marital status:      Spouse name: Not on file   • Number of children: Not on file   • Years of education: Not on file   • Highest education level: Not on file   Tobacco Use   • Smoking status: Never Smoker   • Smokeless tobacco: Never Used   Substance and Sexual Activity   • Alcohol use: No   • Drug use: No   • Sexual activity: Defer       Family History   Problem Relation Age of Onset   • No Known Problems Mother    • Stroke Father    • Stroke Other        Review of Systems   Constitution: Negative for weakness and malaise/fatigue.   HENT: Positive for hearing loss. Negative for nosebleeds and tinnitus.    Cardiovascular: Negative for chest pain, claudication, dyspnea on exertion, irregular heartbeat, leg swelling, near-syncope, orthopnea, palpitations and syncope.   Respiratory: Negative for cough, shortness of breath and wheezing.    Musculoskeletal: Negative for back pain, joint pain, myalgias and neck pain.  "  Gastrointestinal: Negative for anorexia, heartburn, nausea and vomiting.   Genitourinary: Negative for dysuria, hematuria, hesitancy and nocturia.   Neurological: Negative for brief paralysis, difficulty with concentration, disturbances in coordination, dizziness, light-headedness, loss of balance, numbness and seizures.   Psychiatric/Behavioral: Negative for depression and memory loss. The patient is not nervous/anxious.            Objective     /74 (BP Location: Left arm)   Pulse 64   Ht 172.7 cm (67.99\")   Wt 83.5 kg (184 lb)   BMI 27.98 kg/m²     Physical Exam   Constitutional: He is oriented to person, place, and time. He appears well-developed and well-nourished.   HENT:   Head: Normocephalic and atraumatic.   Eyes: EOM are normal. Pupils are equal, round, and reactive to light.   Neck: Normal range of motion. Neck supple.   Cardiovascular: Normal rate and regular rhythm.   Murmur heard.  Pulmonary/Chest: Effort normal and breath sounds normal.   Abdominal: Soft.   Musculoskeletal: Normal range of motion.   Neurological: He is alert and oriented to person, place, and time.   Skin: Skin is warm and dry.   Psychiatric: He has a normal mood and affect. His behavior is normal.       Procedures    Assessment/Plan     HR and BP are both stable.  HTN well managed on current, no adjustment to current recommended.  He continues on ASA therapy for history of CAD with stenting and tolerates well. No chest discomfort or other cardiac concerns are voiced.  No repeat workup recommended. It appears he is on no medication for hyperlipidemia but his most [facility] shows his LDL at goal at 92.  For now, dietary modification will be advised with limited fried food, fatty food, starch, and activity as tolerated advised.  Patient is doing well after recent cochlear implant is expected to have it turned on May 16th.  BMI elevated at 27.98 but patient remains very active at home walking on the " treadmill and being busy around the home.  He was urged to continued.  6 month follow up advised or sooner if needed.      Problems Addressed this Visit        Cardiovascular and Mediastinum    Essential hypertension - Primary    Hyperlipidemia    CAD (coronary artery disease)       Other    History of placement of stent in LAD coronary artery      Other Visit Diagnoses     Other specified hearing loss of both ears        Overweight              Patient's Body mass index is 27.98 kg/m². BMI is above normal parameters. Recommendations include: nutrition counseling.            Electronically signed by AIDA Banegas April 30, 2019 4:32 PM

## 2019-11-21 ENCOUNTER — OFFICE VISIT (OUTPATIENT)
Dept: CARDIOLOGY | Facility: CLINIC | Age: 84
End: 2019-11-21

## 2019-11-21 VITALS
DIASTOLIC BLOOD PRESSURE: 70 MMHG | WEIGHT: 193.6 LBS | HEIGHT: 68 IN | BODY MASS INDEX: 29.34 KG/M2 | HEART RATE: 80 BPM | SYSTOLIC BLOOD PRESSURE: 118 MMHG

## 2019-11-21 DIAGNOSIS — I25.10 CORONARY ARTERY DISEASE INVOLVING NATIVE CORONARY ARTERY OF NATIVE HEART WITHOUT ANGINA PECTORIS: Primary | ICD-10-CM

## 2019-11-21 DIAGNOSIS — I10 ESSENTIAL HYPERTENSION: ICD-10-CM

## 2019-11-21 DIAGNOSIS — Z95.5 HISTORY OF PLACEMENT OF STENT IN LAD CORONARY ARTERY: ICD-10-CM

## 2019-11-21 DIAGNOSIS — E78.2 MIXED HYPERLIPIDEMIA: ICD-10-CM

## 2019-11-21 PROCEDURE — 99213 OFFICE O/P EST LOW 20 MIN: CPT | Performed by: NURSE PRACTITIONER

## 2019-11-21 RX ORDER — CARVEDILOL 3.12 MG/1
3.12 TABLET ORAL 2 TIMES DAILY
Qty: 180 TABLET | Refills: 3 | Status: SHIPPED | OUTPATIENT
Start: 2019-11-21 | End: 2020-06-02 | Stop reason: SDUPTHER

## 2019-11-21 RX ORDER — ATORVASTATIN CALCIUM 20 MG/1
20 TABLET, FILM COATED ORAL DAILY
COMMUNITY
End: 2020-06-02 | Stop reason: SINTOL

## 2019-11-21 NOTE — PROGRESS NOTES
Chief Complaint   Patient presents with   • Follow-up     Cardiac management. He is to have cochlear implant 12/5/19 at  to right ear.   • Lab     Last [facility] months [facility].   • Blood pressure     Has been elevated for the last couple of months. He feels could be eating more sodium.    • Med Refill     Needs refills on Coreg-90 day. Patient verbalized current medication list.       Jeronimo Granger is an 84 y.o. male with HTN, hyperlipidemia, and ischemic heart disease diagnosed in 2008 when he underwent stenting of the LAD. In 2014, he started following with Dr. Santos. Stress done at that time showed no ischemic burden and lisinopril was added. He historically has been intolerant to beta blockers from marked bradycardia but later tolerated low dose carvedilol after stress in October 2017 due to increased SOB showed increased chronotropic and bp response. No ischemia was noted. He came today for follow up. He feels great. No cardiac symptoms. No CP, SOB, palpitations, dizziness, fatigue. He is planning to have second cochlear implant to right ear next month at . Left ear implant was successful. He walks daily at either [employer] or [employer]. [facility]. In June 2019, A1C 6.7%, , HDL 34. GFR normal, LFT normal.   HPI         Cardiac History:    Past Surgical History:   Procedure Laterality Date   • CARDIAC CATHETERIZATION  07/23/2008    3.0x28 Taxus in LAD   • CARDIOVASCULAR STRESS TEST  05/19/2014    Stress- 4 min, 97% THR. 164/84. negative for ischemia, Lisinopril added   • CARDIOVASCULAR STRESS TEST  10/18/2017    3 Min,11 Secs. 98% THR. BP- 173/82. Negative   • CARDIOVASCULAR STRESS TEST  10/18/2017    3 min 11 sec, 98% THR, 173/82, no definite ischemia, low dose Coreg added   • ECHO - CONVERTED  05/19/2014    Echo- EF 65%, RVSP- 35 mmHg   • ECHO - CONVERTED  10/18/2017    EF 65%. RVSP- 34 mmHg   • ECHO - CONVERTED  10/18/2017    EF 65%, mild MR, RVSP 34 mmHg        Current Outpatient Medications   Medication Sig Dispense Refill   • aspirin 81 MG EC tablet Take 81 mg by mouth daily.     • atorvastatin (LIPITOR) 20 MG tablet Take 20 mg by mouth Daily.     • carvedilol (COREG) 3.125 MG tablet Take 1 tablet by mouth 2 (Two) Times a Day. 180 tablet 3   • finasteride (PROSCAR) 5 MG tablet Take 5 mg by mouth daily.     • mirtazapine (REMERON) 30 MG tablet Take 30 mg by mouth every night.     • tamsulosin (FLOMAX) 0.4 MG capsule 24 hr capsule Take 1 capsule by mouth Every Night.       No current facility-administered medications for this visit.        Patient has no known allergies.    Past Medical History:   Diagnosis Date   • Arthritis    • BPH (benign prostatic hyperplasia)    • CAD (coronary artery disease)     s/p stenting   • History of cochlear implant 04/15/2019    left   • Hypercholesteremia    • Hypertension        Social History     Socioeconomic History   • Marital status:      Spouse name: Not on file   • Number of children: Not on file   • Years of education: Not on file   • Highest education level: Not on file   Tobacco Use   • Smoking status: Never Smoker   • Smokeless tobacco: Never Used   Substance and Sexual Activity   • Alcohol use: No   • Drug use: No   • Sexual activity: Defer       Family History   Problem Relation Age of Onset   • No Known Problems Mother    • Stroke Father    • Stroke Other        Review of Systems   Constitution: Negative for decreased appetite, weakness and malaise/fatigue.   HENT: Positive for hearing loss.    Eyes: Negative.    Cardiovascular: Negative for chest pain, dyspnea on exertion, leg swelling, palpitations and syncope.   Respiratory: Negative for cough and shortness of breath.    Endocrine: Negative.    Hematologic/Lymphatic: Negative.    Skin: Negative.    Musculoskeletal: Positive for muscle cramps (occasionally ).   Gastrointestinal: Negative for abdominal pain and melena.   Genitourinary: Negative for dysuria and  "hematuria.   Neurological: Negative for dizziness.   Psychiatric/Behavioral: Negative for altered mental status and depression.   Allergic/Immunologic: Negative.       Diabetes- Yes  Thyroid-normal    Objective     /70 (BP Location: Left arm)   Pulse 80   Ht 172.7 cm (67.99\")   Wt 87.8 kg (193 lb 9.6 oz)   BMI 29.44 kg/m²     Physical Exam   Constitutional: He is oriented to person, place, and time. He appears well-developed and well-nourished. No distress.   HENT:   Head: Normocephalic.   Eyes: Pupils are equal, round, and reactive to light.   Neck: Normal range of motion.   Cardiovascular: Normal rate, regular rhythm and intact distal pulses.   Murmur heard.   Systolic murmur is present with a grade of 1/6 at the apex.  Pulmonary/Chest: Effort normal and breath sounds normal. No respiratory distress.   Abdominal: Soft. Bowel sounds are normal.   Musculoskeletal: Normal range of motion. He exhibits no edema.   Neurological: He is alert and oriented to person, place, and time.   Skin: Skin is warm and dry. He is not diaphoretic.   Psychiatric: He has a normal mood and affect.   Nursing note and vitals reviewed.     Procedures          Assessment/Plan      Mamadou was seen today for follow-up, lab, blood pressure and med refill.    Diagnoses and all orders for this visit:    Coronary artery disease involving native coronary artery of native heart without angina pectoris  -     carvedilol (COREG) 3.125 MG tablet; Take 1 tablet by mouth 2 (Two) Times a Day.    Essential hypertension  -     carvedilol (COREG) 3.125 MG tablet; Take 1 tablet by mouth 2 (Two) Times a Day.    Mixed hyperlipidemia    History of placement of stent in LAD coronary artery      Heart rate and blood pressure are well controlled. Continue carvedilol. We reviewed his last stress test in 2017 showing no ischemia, normal LV function. He denies any recurrent anginal symptoms, so continue conservative management. A1C well controlled at 6.7%. " Continue regular walking. We reviewed the lipids showing LDL not at goal at 109 with history of LAD stent. He does have mild statin induced myalgia/muscle cramping. I did not increase the Lipitor at this time. He is encouraged to get OTC CoQ10 to see if beneficial as well as increasing fluid intake. If the leg cramps improve, we can try increasing Lipitor to 40 mg. Or can try Zetia. He appears to be stable at this time. No changes made. Follow up in six months or sooner if needed.     Patient's Body mass index is 29.44 kg/m². BMI is above normal parameters. Recommendations include: nutrition counseling.                   Electronically signed by AIDA Lazcano,  November 22, 2019 12:50 PM

## 2020-06-02 ENCOUNTER — TELEPHONE (OUTPATIENT)
Dept: CARDIOLOGY | Facility: CLINIC | Age: 85
End: 2020-06-02

## 2020-06-02 ENCOUNTER — OFFICE VISIT (OUTPATIENT)
Dept: CARDIOLOGY | Facility: CLINIC | Age: 85
End: 2020-06-02

## 2020-06-02 VITALS
WEIGHT: 180.4 LBS | DIASTOLIC BLOOD PRESSURE: 80 MMHG | HEIGHT: 68 IN | HEART RATE: 78 BPM | BODY MASS INDEX: 27.34 KG/M2 | SYSTOLIC BLOOD PRESSURE: 144 MMHG

## 2020-06-02 DIAGNOSIS — E78.2 MIXED HYPERLIPIDEMIA: ICD-10-CM

## 2020-06-02 DIAGNOSIS — I10 ESSENTIAL HYPERTENSION: ICD-10-CM

## 2020-06-02 DIAGNOSIS — I25.10 CORONARY ARTERY DISEASE INVOLVING NATIVE CORONARY ARTERY OF NATIVE HEART WITHOUT ANGINA PECTORIS: ICD-10-CM

## 2020-06-02 DIAGNOSIS — Z95.5 HISTORY OF PLACEMENT OF STENT IN LAD CORONARY ARTERY: ICD-10-CM

## 2020-06-02 PROCEDURE — 99213 OFFICE O/P EST LOW 20 MIN: CPT | Performed by: NURSE PRACTITIONER

## 2020-06-02 RX ORDER — CARVEDILOL 3.12 MG/1
3.12 TABLET ORAL 2 TIMES DAILY
Qty: 180 TABLET | Refills: 3 | Status: SHIPPED | OUTPATIENT
Start: 2020-06-02 | End: 2020-12-07 | Stop reason: ALTCHOICE

## 2020-06-02 RX ORDER — TROSPIUM CHLORIDE 20 MG/1
20 TABLET, FILM COATED ORAL DAILY
COMMUNITY

## 2020-06-02 NOTE — PROGRESS NOTES
Chief Complaint   Patient presents with   • Follow-up     Cardiac management. [facility] . Has stopped taking Lipitor d/t Myalgia . Has no cardiac complaints today.    • Med Refill     No refills needed today, had medication list today       Subjective       Mamadou Granger is a 84 y.o. male with HTN, hyperlipidemia, and ischemic heart disease diagnosed in 2008 when he underwent stenting of the LAD. In 2014, he started following with Dr. Santos. Stress done at that time showed no ischemic burden and lisinopril was added. He historically has been intolerant to beta blockers from marked bradycardia but later tolerated low dose carvedilol after stress in October 2017 due to increased SOB showed increased chronotropic and bp response. No ischemia was noted.  In 2019 he underwent a second cochlear implant without issues.    Today he comes to the office for a follow-up visit.  He denies cardiac symptoms or concerns at this time.  According to patient he is [facility].  It was scheduled earlier in the year and underwent cardiac work-up prior with normal results.  However, due to COVID-19 the procedure was postponed.  He continues to walk most days for exercise.  He has been monitoring his diet and lost over 10 pounds.    HPI     Cardiac History:    Past Surgical History:   Procedure Laterality Date   • CARDIAC CATHETERIZATION  07/23/2008    3.0x28 Taxus in LAD   • CARDIOVASCULAR STRESS TEST  05/19/2014    Stress- 4 min, 97% THR. 164/84. negative for ischemia, Lisinopril added   • CARDIOVASCULAR STRESS TEST  10/18/2017    3 Min,11 Secs. 98% THR. BP- 173/82. Negative   • CARDIOVASCULAR STRESS TEST  10/18/2017    3 min 11 sec, 98% THR, 173/82, no definite ischemia, low dose Coreg added   • ECHO - CONVERTED  05/19/2014    Echo- EF 65%, RVSP- 35 mmHg   • ECHO - CONVERTED  10/18/2017    EF 65%. RVSP- 34 mmHg   • ECHO - CONVERTED  10/18/2017      EF 65%, mild MR, RVSP 34 mmHg       Current Outpatient Medications   Medication Sig Dispense Refill   • aspirin 81 MG EC tablet Take 81 mg by mouth daily.     • carvedilol (COREG) 3.125 MG tablet Take 1 tablet by mouth 2 (Two) Times a Day. 180 tablet 3   • mirtazapine (REMERON) 30 MG tablet Take 30 mg by mouth every night.     • trospium (SANCTURA) 20 MG tablet Take 20 mg by mouth Daily.       No current facility-administered medications for this visit.        Patient has no known allergies.    Past Medical History:   Diagnosis Date   • Arthritis    • BPH (benign prostatic hyperplasia)    • CAD (coronary artery disease)     s/p stenting   • History of cochlear implant 04/15/2019    left   • Hypercholesteremia    • Hypertension        Social History     Socioeconomic History   • Marital status:      Spouse name: Not on file   • Number of children: Not on file   • Years of education: Not on file   • Highest education level: Not on file   Tobacco Use   • Smoking status: Never Smoker   • Smokeless tobacco: Never Used   Substance and Sexual Activity   • Alcohol use: No   • Drug use: No   • Sexual activity: Defer       Family History   Problem Relation Age of Onset   • No Known Problems Mother    • Stroke Father    • Stroke Other        Review of Systems   Constitution: Positive for weight loss (intentional). Negative for decreased appetite.   HENT: Positive for hearing loss (improved with implants). Negative for congestion and hoarse voice.    Eyes: Negative for redness and visual disturbance.   Cardiovascular: Negative for chest pain, irregular heartbeat, leg swelling, near-syncope and palpitations.   Respiratory: Negative.    Endocrine: Negative.    Hematologic/Lymphatic: Negative.    Skin: Negative.    Musculoskeletal: Positive for arthritis and joint pain. Negative for joint swelling, muscle cramps and muscle weakness.   Gastrointestinal: Negative.    Genitourinary: Negative for dysuria and hematuria (had  "[facility] that showed blood in urine, none visible).   Neurological: Negative for dizziness, light-headedness and weakness.   Psychiatric/Behavioral: Negative for memory loss. The patient does not have insomnia and is not nervous/anxious.    Allergic/Immunologic: Negative.         Objective      [facility]. In June 2019, A1C 6.7%, , HDL 34. GFR normal, LFT normal.     /80 (BP Location: Left arm)   Pulse 78   Ht 172.7 cm (67.99\")   Wt 81.8 kg (180 lb 6.4 oz)   BMI 27.44 kg/m²     Physical Exam   Constitutional: He is oriented to person, place, and time. He appears well-nourished.   HENT:   Head: Normocephalic.   Eyes: Pupils are equal, round, and reactive to light. Conjunctivae are normal.   Neck: Normal range of motion. Neck supple. Carotid bruit is not present.   Cardiovascular: Normal rate, regular rhythm, S1 normal, S2 normal and normal pulses.   No murmur heard.  Pulmonary/Chest: Breath sounds normal.   Abdominal: Soft. Bowel sounds are normal.   Musculoskeletal: Normal range of motion. He exhibits no edema.   Neurological: He is alert and oriented to person, place, and time.   Skin: Skin is warm. No pallor.   Psychiatric: He has a normal mood and affect. His behavior is normal.        Procedures: none today         Assessment/Plan      Mamadou was seen today for follow-up and med refill.    Diagnoses and all orders for this visit:    Coronary artery disease involving native coronary artery of native heart without angina pectoris  -     carvedilol (COREG) 3.125 MG tablet; Take 1 tablet by mouth 2 (Two) Times a Day.    History of placement of stent in LAD coronary artery    Essential hypertension  -     carvedilol (COREG) 3.125 MG tablet; Take 1 tablet by mouth 2 (Two) Times a Day.    Mixed hyperlipidemia      CAD-patient presents today without cardiac complaints or concerns.  He remains on daily low-dose aspirin without GI symptoms or increased bruising.  He has stopped statin therapy " due to myalgia.  Blood pressure remains well controlled.  According to patient he did have some [facility] earlier in the year with good results.  No repeat cardiac testing ordered at this time.    Hypertension- on low-dose beta-blocker.  Blood pressure and heart rate normal today.  Prescription sent to pharmacy to continue carvedilol at 3.125 mg twice a day.  He monitors blood pressure and heart rate.  He understands to call for any issues.    Hyperlipidemia- currently off statin therapy.  [facility].  I did not restart statin therapy at this time.  He will continue diet and exercise management.  If LDL increases consider alternate statin medication.    Patient's Body mass index is 27.44 kg/m². BMI is above normal parameters. Recommendations include: nutrition counseling.  His weight is down 13 pounds since last office visit.  I encouraged him on his diet efforts and goal BMI of 25.  He maintains daily exercise in form of walking.     Mamadou Granger  reports that he has never smoked. He has never used smokeless tobacco.     From a cardiac standpoint patient appears stable at this time.  We will see him back in 6 months.           Electronically signed by AIDA Valladares,  June 2, 2020 09:11

## 2020-06-02 NOTE — TELEPHONE ENCOUNTER
"April,  He follows [facility] for primary care. I do not have a number to send note to. Is there a \"generic\" fax can send notes to?   "

## 2020-06-02 NOTE — TELEPHONE ENCOUNTER
I spoke with [facility] (phone) [phone].    His PCP is Dr. Tomlinson, Fax # [phone]

## 2020-12-07 ENCOUNTER — OFFICE VISIT (OUTPATIENT)
Dept: CARDIOLOGY | Facility: CLINIC | Age: 85
End: 2020-12-07

## 2020-12-07 VITALS
HEIGHT: 68 IN | HEART RATE: 76 BPM | DIASTOLIC BLOOD PRESSURE: 82 MMHG | TEMPERATURE: 97.9 F | SYSTOLIC BLOOD PRESSURE: 138 MMHG | BODY MASS INDEX: 28.49 KG/M2 | WEIGHT: 188 LBS

## 2020-12-07 DIAGNOSIS — E66.3 OVERWEIGHT: ICD-10-CM

## 2020-12-07 DIAGNOSIS — I25.10 CORONARY ARTERY DISEASE INVOLVING NATIVE CORONARY ARTERY OF NATIVE HEART WITHOUT ANGINA PECTORIS: Primary | ICD-10-CM

## 2020-12-07 DIAGNOSIS — I10 ESSENTIAL HYPERTENSION: ICD-10-CM

## 2020-12-07 DIAGNOSIS — E11.9 TYPE 2 DIABETES MELLITUS WITHOUT COMPLICATION, WITHOUT LONG-TERM CURRENT USE OF INSULIN (HCC): ICD-10-CM

## 2020-12-07 DIAGNOSIS — Z95.5 HISTORY OF PLACEMENT OF STENT IN LAD CORONARY ARTERY: ICD-10-CM

## 2020-12-07 DIAGNOSIS — E78.2 MIXED HYPERLIPIDEMIA: ICD-10-CM

## 2020-12-07 PROCEDURE — 99214 OFFICE O/P EST MOD 30 MIN: CPT | Performed by: NURSE PRACTITIONER

## 2020-12-07 RX ORDER — ATORVASTATIN CALCIUM 40 MG/1
40 TABLET, FILM COATED ORAL DAILY
COMMUNITY
End: 2021-06-07 | Stop reason: DRUGHIGH

## 2020-12-07 RX ORDER — CARVEDILOL 6.25 MG/1
6.25 TABLET ORAL 2 TIMES DAILY WITH MEALS
COMMUNITY
End: 2022-02-01 | Stop reason: SDUPTHER

## 2020-12-07 RX ORDER — GLIPIZIDE 5 MG/1
2.5 TABLET ORAL
COMMUNITY

## 2020-12-07 NOTE — PROGRESS NOTES
Chief Complaint   Patient presents with   • Follow-up     For cardiac management. Patient is on aspirin. Coreg is now 6.25 and is now on lipitor. Last lab work was [facility] on [facility], copy in [facility].    • Med Refill     [facility] Brought medication list with visit.        Cardiac Complaints  none      Subjective   Mamadou Granger is a 85 y.o. male with HTN, hyperlipidemia, DM, and ischemic heart disease diagnosed in 2008 when he underwent stenting of the LAD. In 2014, he started following with Dr. Santos. Stress done at that time showed no ischemic burden and lisinopril was added. He historically has been intolerant to beta blockers from marked bradycardia but later tolerated low dose carvedilol after stress in October 2017 due to increased SOB showed increased chronotropic and bp response. No ischemia was noted.  In 2019 he underwent a second cochlear implant without issues.    Patient returns today for follow up and no new complaints are voiced. Chest pain, SOA, palpitations, dizziness, and syncope are all denied. [facility]. Most recent from July 2020 show: HH 14.9/43.5, HDL 35, LDL 88, Na 139, K 4.5, 7.9%, BUN 15, Creatinine 1.14, AST 23, ALT 19, TSH 0.77.  He has reinitiated statin therapy and has tolerated well. Patient also started on glucotrol after most recent labs.         Cardiac History  Past Surgical History:   Procedure Laterality Date   • CARDIAC CATHETERIZATION  07/23/2008    3.0x28 Taxus in LAD   • CARDIOVASCULAR STRESS TEST  05/19/2014    Stress- 4 min, 97% THR. 164/84. negative for ischemia, Lisinopril added   • CARDIOVASCULAR STRESS TEST  10/18/2017    3 Min,11 Secs. 98% THR. BP- 173/82. Negative   • CARDIOVASCULAR STRESS TEST  10/18/2017    3 min 11 sec, 98% THR, 173/82, no definite ischemia, low dose Coreg added   • ECHO - CONVERTED  05/19/2014    Echo- EF 65%, RVSP- 35 mmHg   • ECHO - CONVERTED  10/18/2017    EF 65%. RVSP- 34 mmHg   • ECHO - CONVERTED   10/18/2017    EF 65%, mild MR, RVSP 34 mmHg       Current Outpatient Medications   Medication Sig Dispense Refill   • aspirin 81 MG EC tablet Take 81 mg by mouth daily.     • atorvastatin (LIPITOR) 40 MG tablet Take 40 mg by mouth Daily.     • carvedilol (COREG) 6.25 MG tablet Take 6.25 mg by mouth 2 (Two) Times a Day With Meals.     • glipizide (GLUCOTROL) 5 MG tablet Take 5 mg by mouth 2 (Two) Times a Day Before Meals.     • mirtazapine (REMERON) 30 MG tablet Take 30 mg by mouth every night.     • trospium (SANCTURA) 20 MG tablet Take 20 mg by mouth Daily.       No current facility-administered medications for this visit.        Patient has no known allergies.    Past Medical History:   Diagnosis Date   • Arthritis    • BPH (benign prostatic hyperplasia)    • CAD (coronary artery disease)     s/p stenting   • History of cochlear implant 04/15/2019    left   • Hypercholesteremia    • Hypertension        Social History     Socioeconomic History   • Marital status:      Spouse name: Not on file   • Number of children: Not on file   • Years of education: Not on file   • Highest education level: Not on file   Tobacco Use   • Smoking status: Never Smoker   • Smokeless tobacco: Never Used   Substance and Sexual Activity   • Alcohol use: No   • Drug use: No   • Sexual activity: Defer       Family History   Problem Relation Age of Onset   • No Known Problems Mother    • Stroke Father    • Stroke Other        Review of Systems   Constitution: Negative for malaise/fatigue and night sweats.   Cardiovascular: Negative for chest pain, claudication, dyspnea on exertion, irregular heartbeat, leg swelling, near-syncope, orthopnea, palpitations and syncope.   Respiratory: Negative for cough, shortness of breath and wheezing.    Musculoskeletal: Positive for stiffness. Negative for back pain and joint pain.   Gastrointestinal: Negative for anorexia, heartburn, melena, nausea and vomiting.   Genitourinary: Negative for  "dysuria, hematuria, hesitancy and nocturia.   Neurological: Negative for dizziness, light-headedness and loss of balance.   Psychiatric/Behavioral: Negative for depression and memory loss. The patient is not nervous/anxious.            Objective     /82 (BP Location: Left arm)   Pulse 76   Temp 97.9 °F (36.6 °C)   Ht 172.7 cm (67.99\")   Wt 85.3 kg (188 lb)   BMI 28.59 kg/m²     Constitutional:       Appearance: Healthy appearance. Not in distress.   Eyes:      Pupils: Pupils are equal, round, and reactive to light.   HENT:      Nose: Nose normal.   Neck:      Musculoskeletal: Normal range of motion and neck supple.   Pulmonary:      Effort: Pulmonary effort is normal.   Cardiovascular:      PMI at left midclavicular line. Normal rate. Regular rhythm.      Murmurs: There is a high frequency blowing holosystolic murmur at the apex.   Abdominal:      Palpations: Abdomen is soft.   Musculoskeletal: Normal range of motion.   Skin:     General: Skin is warm and dry.   Neurological:      Mental Status: Oriented to person, place and time.         Procedures    Assessment/Plan     IHD:  Status stable. No new concerns are voiced.  On ASA therapy without concerns, bleeding and bruising denied.    HTN: Blood pressure is stable. No changes to current coreg therapy recommended. Limited sodium intake advised.    Hyperlipidemia:  On statin therapy with lipitor. FLP [facility].  Most recent shows LDL stable at 88.  He is tolerating the statin well.     DM:  AIC followed by [facility]. He was started on glucotrol recently and has tolerated well. AIC will be rechecked again soon.     Refills managed by PCP.    BMI noted at 28.59, good cardiac ADA diet with limited carbs, calories, and activity as tolerated advised.    6 month follow up recommended or sooner if needed.          Problems Addressed this Visit        Cardiovascular and Mediastinum    Essential hypertension    Relevant Medications    carvedilol (COREG) 6.25 " MG tablet    Hyperlipidemia    Relevant Medications    atorvastatin (LIPITOR) 40 MG tablet    CAD (coronary artery disease) - Primary    Relevant Medications    carvedilol (COREG) 6.25 MG tablet       Other    History of placement of stent in LAD coronary artery      Other Visit Diagnoses     Type 2 diabetes mellitus without complication, without long-term current use of insulin (CMS/HCC)        Relevant Medications    glipizide (GLUCOTROL) 5 MG tablet    Overweight          Diagnoses       Codes Comments    Coronary artery disease involving native coronary artery of native heart without angina pectoris    -  Primary ICD-10-CM: I25.10  ICD-9-CM: 414.01     Essential hypertension     ICD-10-CM: I10  ICD-9-CM: 401.9     Mixed hyperlipidemia     ICD-10-CM: E78.2  ICD-9-CM: 272.2     History of placement of stent in LAD coronary artery     ICD-10-CM: Z95.5  ICD-9-CM: V45.82     Type 2 diabetes mellitus without complication, without long-term current use of insulin (CMS/HCC)     ICD-10-CM: E11.9  ICD-9-CM: 250.00     Overweight     ICD-10-CM: E66.3  ICD-9-CM: 278.02           Patient's Body mass index is 28.59 kg/m². BMI is above normal parameters. Recommendations include: nutrition counseling.              Electronically signed by AIDA Banegas December 7, 2020 15:45 EST

## 2021-06-07 ENCOUNTER — OFFICE VISIT (OUTPATIENT)
Dept: CARDIOLOGY | Facility: CLINIC | Age: 86
End: 2021-06-07

## 2021-06-07 VITALS
SYSTOLIC BLOOD PRESSURE: 142 MMHG | HEIGHT: 68 IN | WEIGHT: 189 LBS | OXYGEN SATURATION: 93 % | RESPIRATION RATE: 16 BRPM | BODY MASS INDEX: 28.64 KG/M2 | HEART RATE: 84 BPM | DIASTOLIC BLOOD PRESSURE: 86 MMHG

## 2021-06-07 DIAGNOSIS — I10 ESSENTIAL HYPERTENSION: ICD-10-CM

## 2021-06-07 DIAGNOSIS — E78.2 MIXED HYPERLIPIDEMIA: ICD-10-CM

## 2021-06-07 DIAGNOSIS — Z95.5 HISTORY OF PLACEMENT OF STENT IN LAD CORONARY ARTERY: ICD-10-CM

## 2021-06-07 DIAGNOSIS — E66.3 OVERWEIGHT (BMI 25.0-29.9): ICD-10-CM

## 2021-06-07 DIAGNOSIS — I25.10 CORONARY ARTERY DISEASE INVOLVING NATIVE CORONARY ARTERY OF NATIVE HEART WITHOUT ANGINA PECTORIS: Primary | ICD-10-CM

## 2021-06-07 PROCEDURE — 99214 OFFICE O/P EST MOD 30 MIN: CPT | Performed by: NURSE PRACTITIONER

## 2021-06-07 RX ORDER — ATORVASTATIN CALCIUM 20 MG/1
20 TABLET, FILM COATED ORAL DAILY
Qty: 90 TABLET | Refills: 3 | Status: SHIPPED | OUTPATIENT
Start: 2021-06-07 | End: 2022-02-01

## 2021-06-07 NOTE — PROGRESS NOTES
Chief Complaint   Patient presents with   • Follow-up     Cardiac management.  Pt takes aspirin daily.  Last lab work done in April 2021.  In chart under Media.  No cardiac complaints.  Reports doing well   • Med Refill     Pt brings in list of medications.  [facility].  States he stopped taking atorvastatin x 6 months ago due to leg cramps and pain.  He is not on any statin therapy at this time.         Cardiac Complaints  none      Subjective   Mamadou Granger is a 85 y.o. male with HTN, hyperlipidemia, DM, and ischemic heart disease diagnosed in 2008 when he underwent stenting of the LAD. In 2014, he started following with Dr. Santos. Stress done at that time showed no ischemic burden and lisinopril was added. He historically has been intolerant to beta blockers from marked bradycardia but later tolerated low dose carvedilol after stress in October 2017 due to increased SOB showed increased chronotropic and bp response. No ischemia was noted.  In 2019 he underwent a second cochlear implant without issues.  Most recent [facility] with [facility] showed: HH 15.1/45.8, TRIG 158, , HDL 35, , K 4.2, BUN 14, Creatinine 1.02, GFR >60, TSH 1.1451, ALT 25, AST 25, AIC 7.0%.      Patient returns today for follow up.  No new concerns are voiced. No chest pain, dizziness, palpitations, SOA, or syncope reported. He does admit to leg cramps and states he stopped his atorvastatin because of it. Patient is not currently on statin therapy and most recent labs showed lipids not well managed.      Cardiac History  Past Surgical History:   Procedure Laterality Date   • CARDIAC CATHETERIZATION  07/23/2008    3.0x28 Taxus in LAD   • CARDIOVASCULAR STRESS TEST  05/19/2014    Stress- 4 min, 97% THR. 164/84. negative for ischemia, Lisinopril added   • CARDIOVASCULAR STRESS TEST  10/18/2017    3 Min,11 Secs. 98% THR. BP- 173/82. Negative   • CARDIOVASCULAR STRESS TEST  10/18/2017    3 min 11 sec, 98% THR, 173/82, no  definite ischemia, low dose Coreg added   • ECHO - CONVERTED  05/19/2014    Echo- EF 65%, RVSP- 35 mmHg   • ECHO - CONVERTED  10/18/2017    EF 65%. RVSP- 34 mmHg   • ECHO - CONVERTED  10/18/2017    EF 65%, mild MR, RVSP 34 mmHg       Current Outpatient Medications   Medication Sig Dispense Refill   • aspirin 81 MG EC tablet Take 81 mg by mouth daily.     • carvedilol (COREG) 6.25 MG tablet Take 6.25 mg by mouth 2 (Two) Times a Day With Meals.     • glipizide (GLUCOTROL) 5 MG tablet Take 5 mg by mouth 2 (Two) Times a Day Before Meals.     • mirtazapine (REMERON) 30 MG tablet Take 30 mg by mouth every night.     • trospium (SANCTURA) 20 MG tablet Take 20 mg by mouth Daily.     • atorvastatin (LIPITOR) 40 MG tablet Take 40 mg by mouth Daily.       No current facility-administered medications for this visit.       Patient has no known allergies.    Past Medical History:   Diagnosis Date   • Arthritis    • BPH (benign prostatic hyperplasia)    • CAD (coronary artery disease)     s/p stenting   • History of cochlear implant 04/15/2019    left   • Hypercholesteremia    • Hypertension        Social History     Socioeconomic History   • Marital status:      Spouse name: Not on file   • Number of children: Not on file   • Years of education: Not on file   • Highest education level: Not on file   Tobacco Use   • Smoking status: Never Smoker   • Smokeless tobacco: Never Used   Substance and Sexual Activity   • Alcohol use: No   • Drug use: No   • Sexual activity: Defer       Family History   Problem Relation Age of Onset   • No Known Problems Mother    • Stroke Father    • Stroke Other        Review of Systems   Constitutional: Negative for malaise/fatigue and night sweats.   HENT: Positive for hearing loss. Negative for tinnitus.    Cardiovascular: Negative for chest pain, claudication, dyspnea on exertion, irregular heartbeat, leg swelling, near-syncope, orthopnea, palpitations and syncope.   Respiratory: Negative for  "cough, shortness of breath and wheezing.    Musculoskeletal: Positive for joint pain, myalgias and stiffness.   Gastrointestinal: Negative for anorexia, heartburn, melena, nausea and vomiting.   Genitourinary: Negative for dysuria, hematuria, hesitancy and nocturia.   Neurological: Negative for dizziness, light-headedness and loss of balance.   Psychiatric/Behavioral: Negative for depression and memory loss. The patient is not nervous/anxious.            Objective     /86 (BP Location: Left arm, Patient Position: Sitting)   Pulse 84   Resp 16   Ht 172.7 cm (68\")   Wt 85.7 kg (189 lb)   SpO2 93%   BMI 28.74 kg/m²     Constitutional:       Appearance: Healthy appearance. Not in distress.   Eyes:      Pupils: Pupils are equal, round, and reactive to light.   HENT:      Nose: Nose normal.   Pulmonary:      Effort: Pulmonary effort is normal.      Breath sounds: Normal breath sounds.   Cardiovascular:      PMI at left midclavicular line. Normal rate. Regular rhythm.      Murmurs: There is a systolic murmur.   Abdominal:      Palpations: Abdomen is soft.   Musculoskeletal: Normal range of motion.      Cervical back: Normal range of motion and neck supple. Skin:     General: Skin is warm and dry.   Neurological:      Mental Status: Oriented to person, place and time.         Procedures    Assessment/Plan       IHD:  Status stable. No new concerns are voiced.  On ASA therapy without concerns, bleeding and bruising denied.     HTN: Blood pressure is stable but slightly elevated. He states at home it has been well managed. For now, current coreg advised.  Limited sodium intake advised.     Hyperlipidemia:  Not currently on statin therapy.  He had to stop for joint pain. He is willing to try lower dosing and see if tolerance reported. We will restart at 20mg daily.       DM:   Managed with glucotrol therapy.  AIC noted at 7.0%.  Same to be continued. [facility].  He was urged to limit carbs in regards. "      Refills managed by PCP.     BMI noted at 28.74, good cardiac ADA diet with limited carbs, calories, and activity as tolerated advised.     6 month follow up recommended or sooner if needed.              Problems Addressed this Visit        Cardiac and Vasculature    Essential hypertension    Hyperlipidemia    Relevant Medications    atorvastatin (LIPITOR) 20 MG tablet    CAD (coronary artery disease) - Primary    History of placement of stent in LAD coronary artery      Other Visit Diagnoses     Overweight (BMI 25.0-29.9)          Diagnoses       Codes Comments    Coronary artery disease involving native coronary artery of native heart without angina pectoris    -  Primary ICD-10-CM: I25.10  ICD-9-CM: 414.01     Essential hypertension     ICD-10-CM: I10  ICD-9-CM: 401.9     Mixed hyperlipidemia     ICD-10-CM: E78.2  ICD-9-CM: 272.2     History of placement of stent in LAD coronary artery     ICD-10-CM: Z95.5  ICD-9-CM: V45.82     Overweight (BMI 25.0-29.9)     ICD-10-CM: E66.3  ICD-9-CM: 278.02           Patient's Body mass index is 28.74 kg/m². indicating that he is overweight. Good cardiac diet with limited carbs, calories, and activity as tolerated advised.              Electronically signed by AIDA Banegas June 7, 2021 09:10 EDT

## 2022-02-01 ENCOUNTER — OFFICE VISIT (OUTPATIENT)
Dept: CARDIOLOGY | Facility: CLINIC | Age: 87
End: 2022-02-01

## 2022-02-01 VITALS
DIASTOLIC BLOOD PRESSURE: 72 MMHG | BODY MASS INDEX: 28.22 KG/M2 | HEART RATE: 67 BPM | OXYGEN SATURATION: 99 % | WEIGHT: 186.2 LBS | SYSTOLIC BLOOD PRESSURE: 148 MMHG | TEMPERATURE: 98.6 F | HEIGHT: 68 IN

## 2022-02-01 DIAGNOSIS — E11.9 TYPE 2 DIABETES MELLITUS WITHOUT COMPLICATION, WITHOUT LONG-TERM CURRENT USE OF INSULIN: ICD-10-CM

## 2022-02-01 DIAGNOSIS — I10 ESSENTIAL HYPERTENSION: ICD-10-CM

## 2022-02-01 DIAGNOSIS — I25.10 CORONARY ARTERY DISEASE INVOLVING NATIVE CORONARY ARTERY OF NATIVE HEART WITHOUT ANGINA PECTORIS: Primary | ICD-10-CM

## 2022-02-01 DIAGNOSIS — E78.2 MIXED HYPERLIPIDEMIA: ICD-10-CM

## 2022-02-01 DIAGNOSIS — Z95.5 HISTORY OF PLACEMENT OF STENT IN LAD CORONARY ARTERY: ICD-10-CM

## 2022-02-01 DIAGNOSIS — E66.3 OVERWEIGHT (BMI 25.0-29.9): ICD-10-CM

## 2022-02-01 PROCEDURE — 99214 OFFICE O/P EST MOD 30 MIN: CPT | Performed by: NURSE PRACTITIONER

## 2022-02-01 RX ORDER — CARVEDILOL 6.25 MG/1
6.25 TABLET ORAL 2 TIMES DAILY WITH MEALS
Qty: 180 TABLET | Refills: 2 | Status: SHIPPED | OUTPATIENT
Start: 2022-02-01 | End: 2023-02-09 | Stop reason: SDUPTHER

## 2022-02-01 NOTE — PROGRESS NOTES
Chief Complaint   Patient presents with   • Follow-up     Pt is here for cardiac follow up.  Pt had COVID 1/13/22.  Pt denies CP, SOB, dizziness or palpitations.  Pt does take a daily ASA.    • Med Refill     [facility]  He is asking for a paper prescription for his [facility]g [facility].   • Lab Work     Pt states that his [facility] about [facility] months [facility].  He has an apt to follow up on 2/17/21.       Cardiac Complaints  none      Subjective   Mamadou Granger is a 86 y.o. male with HTN, hyperlipidemia, DM, and ischemic heart disease diagnosed in 2008 when he underwent stenting of the LAD. In 2014, he started following with Dr. Santos. Stress done at that time showed no ischemic burden and lisinopril was added. He historically has been intolerant to beta blockers from marked bradycardia but later tolerated low dose carvedilol after stress in October 2017 due to increased SOB showed increased chronotropic and bp response. No ischemia was noted.  In 2019 he underwent a second cochlear implant without issues.      Patient comes today for follow up denies any new concerns. He does report having COVID on 1/13/2022 and did well. He only reports two days of not feeling well and then he bounced back quickly. No CP, SOA, dizziness, palpitations, or syncope reported. He does report death of his son beginning of this month, he has had a hard time with this. Refills are requested.         Cardiac History  Past Surgical History:   Procedure Laterality Date   • CARDIAC CATHETERIZATION  07/23/2008    3.0x28 Taxus in LAD   • CARDIOVASCULAR STRESS TEST  05/19/2014    Stress- 4 min, 97% THR. 164/84. negative for ischemia, Lisinopril added   • CARDIOVASCULAR STRESS TEST  10/18/2017    3 Min,11 Secs. 98% THR. BP- 173/82. Negative   • CARDIOVASCULAR STRESS TEST  10/18/2017    3 min 11 sec, 98% THR, 173/82, no definite ischemia, low dose Coreg added   • ECHO - CONVERTED  05/19/2014    Echo- EF 65%, RVSP-  35 mmHg   • ECHO - CONVERTED  10/18/2017    EF 65%. RVSP- 34 mmHg   • ECHO - CONVERTED  10/18/2017    EF 65%, mild MR, RVSP 34 mmHg       Current Outpatient Medications   Medication Sig Dispense Refill   • aspirin 81 MG EC tablet Take 81 mg by mouth daily.     • carvedilol (COREG) 6.25 MG tablet Take 1 tablet by mouth 2 (Two) Times a Day With Meals. 180 tablet 2   • glipizide (GLUCOTROL) 5 MG tablet Take 7.5 mg by mouth 2 (Two) Times a Day Before Meals.     • mirtazapine (REMERON) 30 MG tablet Take 30 mg by mouth every night.     • trospium (SANCTURA) 20 MG tablet Take 20 mg by mouth Daily.       No current facility-administered medications for this visit.       Patient has no known allergies.    Past Medical History:   Diagnosis Date   • Arthritis    • BPH (benign prostatic hyperplasia)    • CAD (coronary artery disease)     s/p stenting   • History of cochlear implant 04/15/2019    left   • History of COVID-19 01/13/2021   • Hypercholesteremia    • Hypertension        Social History     Socioeconomic History   • Marital status:    Tobacco Use   • Smoking status: Never Smoker   • Smokeless tobacco: Never Used   Vaping Use   • Vaping Use: Never used   Substance and Sexual Activity   • Alcohol use: No   • Drug use: No   • Sexual activity: Defer       Family History   Problem Relation Age of Onset   • No Known Problems Mother    • Stroke Father    • Stroke Other        Review of Systems   Constitutional: Negative for malaise/fatigue and night sweats.   Cardiovascular: Negative for chest pain, claudication, dyspnea on exertion, irregular heartbeat, leg swelling, near-syncope, orthopnea, palpitations and syncope.   Respiratory: Negative for cough, shortness of breath and wheezing.    Musculoskeletal: Positive for stiffness. Negative for back pain and joint pain.   Gastrointestinal: Negative for anorexia, heartburn, melena, nausea and vomiting.   Genitourinary: Negative for dysuria, hematuria, hesitancy and  "nocturia.   Neurological: Negative for dizziness, headaches and light-headedness.   Psychiatric/Behavioral: Positive for depression. Negative for memory loss. The patient is not nervous/anxious.            Objective     /72 (BP Location: Left arm, Patient Position: Sitting)   Pulse 67   Temp 98.6 °F (37 °C)   Ht 172.7 cm (68\")   Wt 84.5 kg (186 lb 3.2 oz)   SpO2 99%   BMI 28.31 kg/m²     Constitutional:       Appearance: Not in distress.   Eyes:      Pupils: Pupils are equal, round, and reactive to light.   HENT:      Nose: Nose normal.   Pulmonary:      Effort: Pulmonary effort is normal.      Breath sounds: Normal breath sounds.   Cardiovascular:      PMI at left midclavicular line. Normal rate. Regular rhythm.      Murmurs: There is a systolic murmur.   Abdominal:      Palpations: Abdomen is soft.   Musculoskeletal:      Cervical back: Normal range of motion and neck supple. Skin:     General: Skin is warm and dry.   Neurological:      Mental Status: Alert and oriented to person, place and time.         Procedures    Assessment/Plan     IHD:  Status stable. No new concerns are voiced.  On ASA therapy without concerns, bleeding and bruising denied.  He has not had a workup since 2017 but would like to wait until summer as he is doing okay and would prefer to wait because of virus.      HTN: Blood pressure is stable but slightly elevated. He states at home it has been well managed. For now, current coreg advised.  Log advised. Limited sodium intake advised.     Hyperlipidemia:  Still not taking..  He had to stop for joint pain. [facility].     DM:   Managed with glucotrol therapy.  AIC noted at 7.0%.  Same to be continued. [facility]. He admits it has been good at 120-130 range.      Refills of coreg requested for printed script.      BMI noted at 28.31 good cardiac ADA diet with limited carbs, calories, and activity as tolerated advised.     6 month follow up " recommended or sooner if needed.     Condolences given on the death of his son in January.       Problems Addressed this Visit        Cardiac and Vasculature    Essential hypertension    Relevant Medications    carvedilol (COREG) 6.25 MG tablet    Hyperlipidemia    CAD (coronary artery disease) - Primary    Relevant Medications    carvedilol (COREG) 6.25 MG tablet    History of placement of stent in LAD coronary artery      Other Visit Diagnoses     Type 2 diabetes mellitus without complication, without long-term current use of insulin (HCC)        Overweight (BMI 25.0-29.9)          Diagnoses       Codes Comments    Coronary artery disease involving native coronary artery of native heart without angina pectoris    -  Primary ICD-10-CM: I25.10  ICD-9-CM: 414.01     History of placement of stent in LAD coronary artery     ICD-10-CM: Z95.5  ICD-9-CM: V45.82     Essential hypertension     ICD-10-CM: I10  ICD-9-CM: 401.9     Mixed hyperlipidemia     ICD-10-CM: E78.2  ICD-9-CM: 272.2     Type 2 diabetes mellitus without complication, without long-term current use of insulin (HCC)     ICD-10-CM: E11.9  ICD-9-CM: 250.00     Overweight (BMI 25.0-29.9)     ICD-10-CM: E66.3  ICD-9-CM: 278.02           Patient's Body mass index is 28.31 kg/m². indicating that he is overweight. Good cardiac ADA diet with limited carbs, calories, and activity as tolerated advised.              Electronically signed by AIDA Banegas February 1, 2022 14:32 EST

## 2022-08-04 ENCOUNTER — OFFICE VISIT (OUTPATIENT)
Dept: CARDIOLOGY | Facility: CLINIC | Age: 87
End: 2022-08-04

## 2022-08-04 VITALS
WEIGHT: 166 LBS | HEIGHT: 68 IN | SYSTOLIC BLOOD PRESSURE: 140 MMHG | HEART RATE: 72 BPM | DIASTOLIC BLOOD PRESSURE: 80 MMHG | BODY MASS INDEX: 25.16 KG/M2

## 2022-08-04 DIAGNOSIS — I25.10 CORONARY ARTERY DISEASE INVOLVING NATIVE CORONARY ARTERY OF NATIVE HEART WITHOUT ANGINA PECTORIS: Primary | ICD-10-CM

## 2022-08-04 DIAGNOSIS — E66.3 OVERWEIGHT (BMI 25.0-29.9): ICD-10-CM

## 2022-08-04 DIAGNOSIS — Z95.5 HISTORY OF PLACEMENT OF STENT IN LAD CORONARY ARTERY: ICD-10-CM

## 2022-08-04 DIAGNOSIS — I10 ESSENTIAL HYPERTENSION: ICD-10-CM

## 2022-08-04 DIAGNOSIS — E11.9 TYPE 2 DIABETES MELLITUS WITHOUT COMPLICATION, WITHOUT LONG-TERM CURRENT USE OF INSULIN: ICD-10-CM

## 2022-08-04 DIAGNOSIS — E78.2 MIXED HYPERLIPIDEMIA: ICD-10-CM

## 2022-08-04 PROCEDURE — 99213 OFFICE O/P EST LOW 20 MIN: CPT | Performed by: NURSE PRACTITIONER

## 2022-08-04 RX ORDER — OMEPRAZOLE 20 MG/1
20 CAPSULE, DELAYED RELEASE ORAL DAILY
COMMUNITY
End: 2023-02-09

## 2022-08-04 RX ORDER — POLYETHYLENE GLYCOL 3350 17 G/17G
17 POWDER, FOR SOLUTION ORAL DAILY
COMMUNITY
End: 2023-02-09

## 2022-08-04 RX ORDER — AMMONIUM LACTATE 120 MG/G
1 CREAM TOPICAL AS NEEDED
COMMUNITY
End: 2023-02-09

## 2022-08-04 NOTE — PROGRESS NOTES
Chief Complaint   Patient presents with   • Follow-up     For cardiac management. Patient is on aspirin. Last [facility] work [facility].    • Med Refill     [facility] Brought medication list with visit.        Cardiac Complaints  none      Subjective   Mamadou Granger is a 87 y.o. male with HTN, hyperlipidemia, DM, and ischemic heart disease diagnosed in 2008 when he underwent stenting of the LAD. In 2014, he started following with Dr. Santos. Stress done at that time showed no ischemic burden and lisinopril was added. He historically has been intolerant to beta blockers from marked bradycardia but later tolerated low dose carvedilol after stress in October 2017 due to increased SOB showed increased chronotropic and bp response. No ischemia was noted.  In 2019 he underwent a second cochlear implant without issues.      He comes today for follow up and reports doing well. No CP, SOA, palpitations, dizziness, or syncope noted. [facility]. Labs available from Feb 2022 showed: AIC 6.5%, HH 14.7/43.7, TRIG 100, HDL 32, , Na 139, K 4.4, Creatinine 0.98, GFR 60, AST 25, ALT 21, TSH 0.6691.  NO refills needed. Patient walks almost daily without concerns.         Cardiac History  Past Surgical History:   Procedure Laterality Date   • CARDIAC CATHETERIZATION  07/23/2008    3.0x28 Taxus in LAD   • CARDIOVASCULAR STRESS TEST  05/19/2014    Stress- 4 min, 97% THR. 164/84. negative for ischemia, Lisinopril added   • CARDIOVASCULAR STRESS TEST  10/18/2017    3 Min,11 Secs. 98% THR. BP- 173/82. Negative   • CARDIOVASCULAR STRESS TEST  10/18/2017    3 min 11 sec, 98% THR, 173/82, no definite ischemia, low dose Coreg added   • ECHO - CONVERTED  05/19/2014    Echo- EF 65%, RVSP- 35 mmHg   • ECHO - CONVERTED  10/18/2017    EF 65%. RVSP- 34 mmHg   • ECHO - CONVERTED  10/18/2017    EF 65%, mild MR, RVSP 34 mmHg       Current Outpatient Medications   Medication Sig Dispense  Refill   • ammonium lactate (AMLACTIN) 12 % cream Apply 1 application topically to the appropriate area as directed As Needed for Dry Skin.     • aspirin 81 MG EC tablet Take 81 mg by mouth daily.     • carvedilol (COREG) 6.25 MG tablet Take 1 tablet by mouth 2 (Two) Times a Day With Meals. 180 tablet 2   • glipizide (GLUCOTROL) 5 MG tablet Take 2.5 mg by mouth 2 (Two) Times a Day Before Meals.     • mirtazapine (REMERON) 30 MG tablet Take 30 mg by mouth every night.     • omeprazole (priLOSEC) 20 MG capsule Take 20 mg by mouth Daily.     • polyethylene glycol (MIRALAX) 17 g packet Take 17 g by mouth Daily.     • trospium (SANCTURA) 20 MG tablet Take 20 mg by mouth Daily.       No current facility-administered medications for this visit.       Patient has no known allergies.    Past Medical History:   Diagnosis Date   • Arthritis    • BPH (benign prostatic hyperplasia)    • CAD (coronary artery disease)     s/p stenting   • History of cochlear implant 04/15/2019    left   • History of COVID-19 01/13/2021   • Hypercholesteremia    • Hypertension        Social History     Socioeconomic History   • Marital status:    Tobacco Use   • Smoking status: Never Smoker   • Smokeless tobacco: Never Used   Vaping Use   • Vaping Use: Never used   Substance and Sexual Activity   • Alcohol use: No   • Drug use: No   • Sexual activity: Defer       Family History   Problem Relation Age of Onset   • No Known Problems Mother    • Stroke Father    • Stroke Other        Review of Systems   Constitutional: Negative for malaise/fatigue and night sweats.   Cardiovascular: Negative for chest pain, claudication, dyspnea on exertion, irregular heartbeat, leg swelling, near-syncope, orthopnea, palpitations and syncope.   Respiratory: Negative for cough, shortness of breath and wheezing.    Musculoskeletal: Negative for back pain, joint pain and stiffness.   Gastrointestinal: Negative for anorexia, heartburn, melena, nausea and vomiting.  "  Genitourinary: Negative for dysuria, hematuria, hesitancy and nocturia.   Neurological: Negative for dizziness, headaches and light-headedness.   Psychiatric/Behavioral: Negative for depression and memory loss. The patient is not nervous/anxious.            Objective     /80 (BP Location: Left arm)   Pulse 72   Ht 172.7 cm (67.99\")   Wt 75.3 kg (166 lb)   BMI 25.25 kg/m²     Constitutional:       Appearance: Frail.   Eyes:      Pupils: Pupils are equal, round, and reactive to light.   HENT:      Nose: Nose normal.   Pulmonary:      Effort: Pulmonary effort is normal.      Breath sounds: Normal breath sounds.   Cardiovascular:      PMI at left midclavicular line. Normal rate. Regular rhythm.      Murmurs: There is a systolic murmur.   Abdominal:      Palpations: Abdomen is soft.   Musculoskeletal: Normal range of motion.      Cervical back: Normal range of motion and neck supple. Skin:     General: Skin is warm and dry.   Neurological:      Mental Status: Alert and oriented to person, place and time.         Procedures         Diagnoses and all orders for this visit:    1. Coronary artery disease involving native coronary artery of native heart without angina pectoris (Primary)    2. Essential hypertension    3. Mixed hyperlipidemia    4. History of placement of stent in LAD coronary artery    5. Type 2 diabetes mellitus without complication, without long-term current use of insulin (HCC)    6. Overweight (BMI 25.0-29.9)             IHD:  Status stable. No new concerns are voiced.  On ASA therapy without concerns, bleeding and bruising denied.  He has not had a workup since 2017 but prefers to defer as he is doing well at the moment without concerns.      HTN: Blood pressure is stable but slightly elevated. He maintains at home it has been well managed. For now, current coreg advised. He will continue to monitor. Limited sodium advised.      Hyperlipidemia:  Unable to tolerate statin therapy due to joint " pain. Most recent LDL over 100, he was urged to try fish oil therapy.      DM:   Managed with glucotrol therapy.  AIC noted at 6.5%.  Same to be continued. [facility]. Limited carb diet advised.      Refills of coreg requested for printed script.      BMI noted at 25.25,  good cardiac ADA diet with limited carbs, calories, and activity as tolerated advised. He reports weight loss from being more active and walking most days.      6 month follow up recommended or sooner if needed.         Problems Addressed this Visit        Cardiac and Vasculature    Essential hypertension    Hyperlipidemia    CAD (coronary artery disease) - Primary    History of placement of stent in LAD coronary artery      Other Visit Diagnoses     Type 2 diabetes mellitus without complication, without long-term current use of insulin (HCC)        Overweight (BMI 25.0-29.9)          Diagnoses       Codes Comments    Coronary artery disease involving native coronary artery of native heart without angina pectoris    -  Primary ICD-10-CM: I25.10  ICD-9-CM: 414.01     Essential hypertension     ICD-10-CM: I10  ICD-9-CM: 401.9     Mixed hyperlipidemia     ICD-10-CM: E78.2  ICD-9-CM: 272.2     History of placement of stent in LAD coronary artery     ICD-10-CM: Z95.5  ICD-9-CM: V45.82     Type 2 diabetes mellitus without complication, without long-term current use of insulin (HCC)     ICD-10-CM: E11.9  ICD-9-CM: 250.00     Overweight (BMI 25.0-29.9)     ICD-10-CM: E66.3  ICD-9-CM: 278.02                     Electronically signed by AIDA Banegas August 4, 2022 09:45 EDT

## 2023-02-09 ENCOUNTER — OFFICE VISIT (OUTPATIENT)
Dept: CARDIOLOGY | Facility: CLINIC | Age: 88
End: 2023-02-09
Payer: MEDICARE

## 2023-02-09 VITALS
DIASTOLIC BLOOD PRESSURE: 64 MMHG | BODY MASS INDEX: 26.78 KG/M2 | HEIGHT: 67 IN | HEART RATE: 70 BPM | SYSTOLIC BLOOD PRESSURE: 122 MMHG | WEIGHT: 170.6 LBS

## 2023-02-09 DIAGNOSIS — Z95.5 HISTORY OF PLACEMENT OF STENT IN LAD CORONARY ARTERY: ICD-10-CM

## 2023-02-09 DIAGNOSIS — I25.10 CORONARY ARTERY DISEASE INVOLVING NATIVE CORONARY ARTERY OF NATIVE HEART WITHOUT ANGINA PECTORIS: Primary | ICD-10-CM

## 2023-02-09 DIAGNOSIS — Z78.9 STATIN INTOLERANCE: ICD-10-CM

## 2023-02-09 DIAGNOSIS — E66.3 OVERWEIGHT: ICD-10-CM

## 2023-02-09 DIAGNOSIS — E78.2 MIXED HYPERLIPIDEMIA: ICD-10-CM

## 2023-02-09 DIAGNOSIS — I10 ESSENTIAL HYPERTENSION: ICD-10-CM

## 2023-02-09 PROCEDURE — 99214 OFFICE O/P EST MOD 30 MIN: CPT | Performed by: NURSE PRACTITIONER

## 2023-02-09 RX ORDER — CARVEDILOL 6.25 MG/1
6.25 TABLET ORAL 2 TIMES DAILY WITH MEALS
Qty: 180 TABLET | Refills: 2 | Status: SHIPPED | OUTPATIENT
Start: 2023-02-09

## 2023-02-09 NOTE — PROGRESS NOTES
Chief Complaint   Patient presents with   • Follow-up     Pt is here for cardiac follow up.  Pt denies CP, SOB, dizziness or palpitations.  Pt does take a daily ASA     • Med Refill     Pt [facility].  Medications were verified by med list.     • Lab Work     Pt states their [facility] (copy provided)         Cardiac Complaints  none      Subjective   Mamadou Granger is a 87 y.o. male with HTN, hyperlipidemia, DM, and ischemic heart disease diagnosed in 2008 when he underwent stenting of the LAD. In 2014, he started following with Dr. Santos. Stress done at that time showed no ischemic burden and lisinopril was added. He historically has been intolerant to beta blockers from marked bradycardia but later tolerated low dose carvedilol after stress in October 2017 due to increased SOB showed increased chronotropic and bp response. No ischemia was noted.  In 2019 he underwent a second cochlear implant without issues    He comes today for follow up and reports doing well. No CP, SOA, palpitations, dizziness, or syncope noted. [facility] checked 12/2022 and showed: HH 14.5/44, TRIG 124, HDL 38, , TSH 0.6988, Creatinine 0.82, BUN 11, K 4.7, Na 141.             Cardiac History  Past Surgical History:   Procedure Laterality Date   • CARDIAC CATHETERIZATION  07/23/2008    3.0x28 Taxus in LAD   • CARDIOVASCULAR STRESS TEST  05/19/2014    Stress- 4 min, 97% THR. 164/84. negative for ischemia, Lisinopril added   • CARDIOVASCULAR STRESS TEST  10/18/2017    3 Min,11 Secs. 98% THR. BP- 173/82. Negative   • CARDIOVASCULAR STRESS TEST  10/18/2017    3 min 11 sec, 98% THR, 173/82, no definite ischemia, low dose Coreg added   • ECHO - CONVERTED  05/19/2014    Echo- EF 65%, RVSP- 35 mmHg   • ECHO - CONVERTED  10/18/2017    EF 65%. RVSP- 34 mmHg   • ECHO - CONVERTED  10/18/2017    EF 65%, mild MR, RVSP 34 mmHg       Current Outpatient Medications   Medication Sig Dispense  Refill   • aspirin 81 MG EC tablet Take 81 mg by mouth daily.     • carvedilol (COREG) 6.25 MG tablet Take 1 tablet by mouth 2 (Two) Times a Day With Meals. 180 tablet 2   • glipizide (GLUCOTROL) 5 MG tablet Take 2.5 mg by mouth 2 (Two) Times a Day Before Meals.     • mirtazapine (REMERON) 30 MG tablet Take 30 mg by mouth every night.     • trospium (SANCTURA) 20 MG tablet Take 20 mg by mouth Daily.       No current facility-administered medications for this visit.       Patient has no known allergies.    Past Medical History:   Diagnosis Date   • Arthritis    • BPH (benign prostatic hyperplasia)    • CAD (coronary artery disease)     s/p stenting   • History of cochlear implant 04/15/2019    left   • History of COVID-19 01/13/2021   • Hypercholesteremia    • Hypertension        Social History     Socioeconomic History   • Marital status:    Tobacco Use   • Smoking status: Never   • Smokeless tobacco: Never   Vaping Use   • Vaping Use: Never used   Substance and Sexual Activity   • Alcohol use: No   • Drug use: No   • Sexual activity: Defer       Family History   Problem Relation Age of Onset   • No Known Problems Mother    • Stroke Father    • Stroke Other        Review of Systems   Constitutional: Negative for malaise/fatigue and night sweats.   HENT: Positive for hearing loss. Negative for tinnitus.    Cardiovascular: Negative for chest pain, claudication, dyspnea on exertion, irregular heartbeat, leg swelling, near-syncope, orthopnea, palpitations and syncope.   Respiratory: Negative for cough, shortness of breath and wheezing.    Musculoskeletal: Negative for back pain, joint pain and stiffness.   Gastrointestinal: Negative for anorexia, heartburn, nausea and vomiting.   Genitourinary: Negative for dysuria, hematuria, hesitancy and nocturia.   Neurological: Negative for dizziness, headaches and light-headedness.   Psychiatric/Behavioral: Negative for depression and memory loss. The patient is not  "nervous/anxious.            Objective     /64 (BP Location: Left arm, Patient Position: Sitting)   Pulse 70   Ht 170.2 cm (67\")   Wt 77.4 kg (170 lb 9.6 oz)   BMI 26.72 kg/m²     Constitutional:       Appearance: Not in distress.   Eyes:      Pupils: Pupils are equal, round, and reactive to light.   HENT:      Nose: Nose normal.   Pulmonary:      Effort: Pulmonary effort is normal.      Breath sounds: Normal breath sounds.   Cardiovascular:      PMI at left midclavicular line. Normal rate. Regular rhythm.      Murmurs: There is a systolic murmur.   Abdominal:      Palpations: Abdomen is soft.   Musculoskeletal: Normal range of motion.      Cervical back: Normal range of motion and neck supple. Skin:     General: Skin is warm and dry.   Neurological:      Mental Status: Alert.         Procedures         Diagnoses and all orders for this visit:    1. Coronary artery disease involving native coronary artery of native heart without angina pectoris (Primary)    2. History of placement of stent in LAD coronary artery    3. Essential hypertension    4. Mixed hyperlipidemia    5. Statin intolerance    6. Overweight    Other orders  -     carvedilol (COREG) 6.25 MG tablet; Take 1 tablet by mouth 2 (Two) Times a Day With Meals.  Dispense: 180 tablet; Refill: 2             IHD:  Status stable. No new concerns are voiced.  On ASA therapy without concerns, bleeding and bruising denied.  He has not had a workup since 2017 but continues to defer as he is doing well. If symptoms should occur, patient will call for further workup.     HTN: Blood pressure is stable. He maintains at home it has been well managed. Current coreg advised. He will continue to monitor. Limited sodium advised.      Hyperlipidemia:  Unable to tolerate statin therapy due to joint pain. Most recent LDL over 100, he was urged to try fish oil therapy.      DM:   Managed with glucotrol therapy.  Same to be continued. [facility]. Limited carb diet " advised.      Refills of coreg requested for printed script.      BMI noted at 26.72,  good cardiac ADA diet with limited carbs, calories, and activity as tolerated advised. He reports weight loss from being more active and walking most days.      6 month follow up recommended or sooner if needed.            Problems Addressed this Visit        Cardiac and Vasculature    Essential hypertension    Relevant Medications    carvedilol (COREG) 6.25 MG tablet    Hyperlipidemia    CAD (coronary artery disease) - Primary    Relevant Medications    carvedilol (COREG) 6.25 MG tablet    History of placement of stent in LAD coronary artery   Other Visit Diagnoses     Statin intolerance        Overweight          Diagnoses       Codes Comments    Coronary artery disease involving native coronary artery of native heart without angina pectoris    -  Primary ICD-10-CM: I25.10  ICD-9-CM: 414.01     History of placement of stent in LAD coronary artery     ICD-10-CM: Z95.5  ICD-9-CM: V45.82     Essential hypertension     ICD-10-CM: I10  ICD-9-CM: 401.9     Mixed hyperlipidemia     ICD-10-CM: E78.2  ICD-9-CM: 272.2     Statin intolerance     ICD-10-CM: Z78.9  ICD-9-CM: 995.27     Overweight     ICD-10-CM: E66.3  ICD-9-CM: 278.02                        Electronically signed by Geri Null, APRN February 9, 2023 11:14 EST

## 2023-08-17 ENCOUNTER — OFFICE VISIT (OUTPATIENT)
Dept: CARDIOLOGY | Facility: CLINIC | Age: 88
End: 2023-08-17
Payer: MEDICARE

## 2023-08-17 VITALS
DIASTOLIC BLOOD PRESSURE: 82 MMHG | HEART RATE: 62 BPM | SYSTOLIC BLOOD PRESSURE: 144 MMHG | WEIGHT: 173.8 LBS | BODY MASS INDEX: 27.28 KG/M2 | HEIGHT: 67 IN

## 2023-08-17 DIAGNOSIS — E66.3 OVERWEIGHT: ICD-10-CM

## 2023-08-17 DIAGNOSIS — I10 ESSENTIAL HYPERTENSION: ICD-10-CM

## 2023-08-17 DIAGNOSIS — I25.10 CORONARY ARTERY DISEASE INVOLVING NATIVE CORONARY ARTERY OF NATIVE HEART WITHOUT ANGINA PECTORIS: Primary | ICD-10-CM

## 2023-08-17 DIAGNOSIS — E78.2 MIXED HYPERLIPIDEMIA: ICD-10-CM

## 2023-08-17 DIAGNOSIS — E11.9 TYPE 2 DIABETES MELLITUS WITHOUT COMPLICATION, WITHOUT LONG-TERM CURRENT USE OF INSULIN: ICD-10-CM

## 2023-08-17 DIAGNOSIS — Z95.5 HISTORY OF PLACEMENT OF STENT IN LAD CORONARY ARTERY: ICD-10-CM

## 2023-08-17 RX ORDER — POLYETHYLENE GLYCOL 3350 17 G/17G
17 POWDER, FOR SOLUTION ORAL DAILY
COMMUNITY

## 2023-08-17 RX ORDER — CARVEDILOL 6.25 MG/1
6.25 TABLET ORAL 2 TIMES DAILY WITH MEALS
Qty: 180 TABLET | Refills: 2 | Status: SHIPPED | OUTPATIENT
Start: 2023-08-17

## 2023-08-17 NOTE — PROGRESS NOTES
Chief Complaint   Patient presents with    Follow-up     Pt is here for cardiac follow up.   Pt denies CP, SOB, dizziness or palpitations.  Pt does take a daily ASA.    Med Refill     Pt request 90 day refills to be sent to [facility].  Medications were verified by med list.      Lab Work     [facility] states [facility].         Cardiac Complaints  none      Subjective   Mamadou Granger is a 88 y.o. male with HTN, hyperlipidemia, DM, and ischemic heart disease diagnosed in 2008 when he underwent stenting of the LAD. In 2014, he started following with Dr. Santos. Stress done at that time showed no ischemic burden and lisinopril was added. He historically has been intolerant to beta blockers from marked bradycardia but later tolerated low dose carvedilol after stress in October 2017 due to increased SOB showed increased chronotropic and bp response. No ischemia was noted.  In 2019 he underwent a second cochlear implant without issues     He comes today for follow up and reports doing well. No CP, SOA, palpitations, dizziness, or syncope reports. [facility], he can not recall when checked last. Refills requested for 90 day supply.                Cardiac History  Past Surgical History:   Procedure Laterality Date    CARDIAC CATHETERIZATION  07/23/2008    3.0x28 Taxus in LAD    CARDIOVASCULAR STRESS TEST  05/19/2014    Stress- 4 min, 97% THR. 164/84. negative for ischemia, Lisinopril added    CARDIOVASCULAR STRESS TEST  10/18/2017    3 Min,11 Secs. 98% THR. BP- 173/82. Negative    CARDIOVASCULAR STRESS TEST  10/18/2017    3 min 11 sec, 98% THR, 173/82, no definite ischemia, low dose Coreg added    ECHO - CONVERTED  05/19/2014    Echo- EF 65%, RVSP- 35 mmHg    ECHO - CONVERTED  10/18/2017    EF 65%. RVSP- 34 mmHg    ECHO - CONVERTED  10/18/2017    EF 65%, mild MR, RVSP 34 mmHg       Current Outpatient Medications   Medication Sig Dispense Refill    aspirin 81 MG EC tablet Take 1 tablet by mouth  Daily.      carvedilol (COREG) 6.25 MG tablet Take 1 tablet by mouth 2 (Two) Times a Day With Meals. 180 tablet 2    glipizide (GLUCOTROL) 5 MG tablet Take 0.5 tablets by mouth 2 (Two) Times a Day Before Meals.      mirtazapine (REMERON) 30 MG tablet Take 1 tablet by mouth Every Night.      polyethylene glycol (MIRALAX) 17 g packet Take 17 g by mouth Daily.      trospium (SANCTURA) 20 MG tablet Take 1 tablet by mouth Daily.       No current facility-administered medications for this visit.       Patient has no known allergies.    Past Medical History:   Diagnosis Date    Arthritis     BPH (benign prostatic hyperplasia)     CAD (coronary artery disease)     s/p stenting    History of cochlear implant 04/15/2019    left    History of COVID-19 01/13/2021    Hypercholesteremia     Hypertension        Social History     Socioeconomic History    Marital status:    Tobacco Use    Smoking status: Never    Smokeless tobacco: Never   Vaping Use    Vaping Use: Never used   Substance and Sexual Activity    Alcohol use: No    Drug use: No    Sexual activity: Defer       Family History   Problem Relation Age of Onset    No Known Problems Mother     Stroke Father     Stroke Other        Review of Systems   Constitutional: Negative for malaise/fatigue and night sweats.   Cardiovascular:  Negative for chest pain, claudication, dyspnea on exertion, irregular heartbeat, leg swelling, near-syncope, orthopnea, palpitations and syncope.   Respiratory:  Negative for cough, shortness of breath and wheezing.    Musculoskeletal:  Negative for back pain, joint pain and stiffness.   Gastrointestinal:  Negative for anorexia, flatus, nausea and vomiting.   Genitourinary:  Negative for dysuria, hematuria, hesitancy and nocturia.   Neurological:  Negative for dizziness, headaches and light-headedness.   Psychiatric/Behavioral:  Negative for depression and memory loss. The patient is not nervous/anxious.          Objective     /82 (BP  "Location: Left arm, Patient Position: Sitting)   Pulse 62   Ht 170.2 cm (67\")   Wt 78.8 kg (173 lb 12.8 oz)   BMI 27.22 kg/mý     Constitutional:       Appearance: Not in distress.   Eyes:      Pupils: Pupils are equal, round, and reactive to light.   HENT:      Nose: Nose normal.   Pulmonary:      Effort: Pulmonary effort is normal.      Breath sounds: Normal breath sounds.   Cardiovascular:      PMI at left midclavicular line. Normal rate. Regular rhythm.      Murmurs: There is a systolic murmur.   Abdominal:      Palpations: Abdomen is soft.   Musculoskeletal: Normal range of motion.      Cervical back: Normal range of motion and neck supple. Skin:     General: Skin is warm and dry.   Neurological:      Mental Status: Alert.       Procedures         Diagnoses and all orders for this visit:    1. Coronary artery disease involving native coronary artery of native heart without angina pectoris (Primary)    2. History of placement of stent in LAD coronary artery    3. Essential hypertension    4. Mixed hyperlipidemia    5. Type 2 diabetes mellitus without complication, without long-term current use of insulin    6. Overweight    Other orders  -     carvedilol (COREG) 6.25 MG tablet; Take 1 tablet by mouth 2 (Two) Times a Day With Meals.  Dispense: 180 tablet; Refill: 2             IHD:  Status stable. No new concerns are voiced.  On ASA therapy without concerns, bleeding and bruising denied.  Last cardiac workup 2017, he continues to defer further workup as he is doing well. If symptoms should occur, patient will call for further workup.     HTN: Blood pressure is stable, mildly elevated. He maintains at home it has been well managed. Current coreg advised. He will continue to monitor. Limited sodium advised. Call with elevation.     Hyperlipidemia:  Unable to tolerate statin therapy due to joint pain. [facility]. Limited carb diet urged.     DM:   Managed with glucotrol therapy.  Same to be continued. Followed " by [facility]. Limited carb diet advised.      Refills of coreg requested for printed script.      BMI noted at 27.22  good cardiac ADA diet with limited carbs, calories, and activity as tolerated advised.      6 month follow up recommended or sooner if needed.                 Problems Addressed this Visit          Cardiac and Vasculature    Essential hypertension    Relevant Medications    carvedilol (COREG) 6.25 MG tablet    Hyperlipidemia    CAD (coronary artery disease) - Primary    Relevant Medications    carvedilol (COREG) 6.25 MG tablet    History of placement of stent in LAD coronary artery     Other Visit Diagnoses       Type 2 diabetes mellitus without complication, without long-term current use of insulin        Overweight              Diagnoses         Codes Comments    Coronary artery disease involving native coronary artery of native heart without angina pectoris    -  Primary ICD-10-CM: I25.10  ICD-9-CM: 414.01     History of placement of stent in LAD coronary artery     ICD-10-CM: Z95.5  ICD-9-CM: V45.82     Essential hypertension     ICD-10-CM: I10  ICD-9-CM: 401.9     Mixed hyperlipidemia     ICD-10-CM: E78.2  ICD-9-CM: 272.2     Type 2 diabetes mellitus without complication, without long-term current use of insulin     ICD-10-CM: E11.9  ICD-9-CM: 250.00     Overweight     ICD-10-CM: E66.3  ICD-9-CM: 278.02                             Electronically signed by Geri Null, AIDA August 17, 2023 09:53 EDT

## 2024-03-07 ENCOUNTER — OFFICE VISIT (OUTPATIENT)
Dept: CARDIOLOGY | Facility: CLINIC | Age: 89
End: 2024-03-07
Payer: MEDICARE

## 2024-03-07 VITALS
BODY MASS INDEX: 28.5 KG/M2 | HEIGHT: 67 IN | WEIGHT: 181.6 LBS | DIASTOLIC BLOOD PRESSURE: 70 MMHG | HEART RATE: 65 BPM | SYSTOLIC BLOOD PRESSURE: 120 MMHG

## 2024-03-07 DIAGNOSIS — I25.10 CORONARY ARTERY DISEASE INVOLVING NATIVE CORONARY ARTERY OF NATIVE HEART WITHOUT ANGINA PECTORIS: Primary | ICD-10-CM

## 2024-03-07 DIAGNOSIS — E78.2 MIXED HYPERLIPIDEMIA: ICD-10-CM

## 2024-03-07 DIAGNOSIS — I10 ESSENTIAL HYPERTENSION: ICD-10-CM

## 2024-03-07 DIAGNOSIS — E11.9 TYPE 2 DIABETES MELLITUS WITHOUT COMPLICATION, WITHOUT LONG-TERM CURRENT USE OF INSULIN: ICD-10-CM

## 2024-03-07 DIAGNOSIS — E66.3 OVERWEIGHT: ICD-10-CM

## 2024-03-07 DIAGNOSIS — Z95.5 HISTORY OF PLACEMENT OF STENT IN LAD CORONARY ARTERY: ICD-10-CM

## 2024-03-07 RX ORDER — CARVEDILOL 6.25 MG/1
6.25 TABLET ORAL 2 TIMES DAILY WITH MEALS
Qty: 180 TABLET | Refills: 2 | Status: SHIPPED | OUTPATIENT
Start: 2024-03-07

## 2024-03-07 NOTE — PROGRESS NOTES
Chief Complaint   Patient presents with    Follow-up     Pt is here for cardiac follow up.  Pt denies CP, SOB, dizziness or palpitations.  He does take a daily ASA.      Med Refill     Pt request 90 day refills to be sent to [facility].  Medications were verified by the pt.      Lab Work     Pt states their last [facility] were about [facility] months [facility].         Cardiac Complaints  none      Subjective   Mamadou Granger is a 88 y.o. male with HTN, hyperlipidemia, DM, and ischemic heart disease diagnosed in 2008 when he underwent stenting of the LAD. In 2014, he started following with Dr. Santos. Stress done at that time showed no ischemic burden and lisinopril was added. He historically has been intolerant to beta blockers from marked bradycardia but later tolerated low dose carvedilol after stress in October 2017 due to increased SOB showed increased chronotropic and bp response. No ischemia was noted.  In 2019 he underwent a second cochlear implant without issues     He comes today for follow up and reports doing well. CP, SOA, dizziness, palpitations, and syncope denied. [facility]. Refills requested.         Cardiac History  Past Surgical History:   Procedure Laterality Date    CARDIAC CATHETERIZATION  07/23/2008    3.0x28 Taxus in LAD    CARDIOVASCULAR STRESS TEST  05/19/2014    Stress- 4 min, 97% THR. 164/84. negative for ischemia, Lisinopril added    CARDIOVASCULAR STRESS TEST  10/18/2017    3 Min,11 Secs. 98% THR. BP- 173/82. Negative    CARDIOVASCULAR STRESS TEST  10/18/2017    3 min 11 sec, 98% THR, 173/82, no definite ischemia, low dose Coreg added    ECHO - CONVERTED  05/19/2014    Echo- EF 65%, RVSP- 35 mmHg    ECHO - CONVERTED  10/18/2017    EF 65%. RVSP- 34 mmHg    ECHO - CONVERTED  10/18/2017    EF 65%, mild MR, RVSP 34 mmHg       Current Outpatient Medications   Medication Sig Dispense Refill    aspirin 81 MG EC tablet Take 1 tablet by mouth Daily.      carvedilol (COREG) 6.25 MG tablet Take 1 tablet by  mouth 2 (Two) Times a Day With Meals. 180 tablet 2    glipizide (GLUCOTROL) 5 MG tablet Take 0.5 tablets by mouth 2 (Two) Times a Day Before Meals.      mirtazapine (REMERON) 30 MG tablet Take 1 tablet by mouth Every Night.      polyethylene glycol (MIRALAX) 17 g packet Take 17 g by mouth Daily.      trospium (SANCTURA) 20 MG tablet Take 1 tablet by mouth Daily.       No current facility-administered medications for this visit.       Patient has no known allergies.    Past Medical History:   Diagnosis Date    Arthritis     BPH (benign prostatic hyperplasia)     CAD (coronary artery disease)     s/p stenting    History of cochlear implant 04/15/2019    left    History of COVID-19 01/13/2021    Hypercholesteremia     Hypertension        Social History     Socioeconomic History    Marital status:    Tobacco Use    Smoking status: Never    Smokeless tobacco: Never   Vaping Use    Vaping status: Never Used   Substance and Sexual Activity    Alcohol use: No    Drug use: No    Sexual activity: Defer       Family History   Problem Relation Age of Onset    No Known Problems Mother     Stroke Father     Stroke Other        Review of Systems   Constitutional: Negative for malaise/fatigue and night sweats.   HENT:          Hard of hearing   Cardiovascular:  Negative for chest pain, claudication, dyspnea on exertion, irregular heartbeat, leg swelling, near-syncope, orthopnea, palpitations and syncope.   Respiratory:  Negative for cough, shortness of breath and wheezing.    Musculoskeletal:  Positive for stiffness. Negative for back pain and joint pain.   Gastrointestinal:  Negative for anorexia, heartburn, nausea and vomiting.   Genitourinary:  Negative for dysuria, hematuria, hesitancy and nocturia.   Neurological:  Negative for dizziness, headaches and light-headedness.   Psychiatric/Behavioral:  Negative for depression and memory loss. The patient is not nervous/anxious.            Objective     /70 (BP Location:  "Left arm, Patient Position: Sitting)   Pulse 65   Ht 170.2 cm (67\")   Wt 82.4 kg (181 lb 9.6 oz)   BMI 28.44 kg/m²     Constitutional:       Appearance: Not in distress.   Eyes:      Pupils: Pupils are equal, round, and reactive to light.   HENT:      Nose: Nose normal.   Pulmonary:      Effort: Pulmonary effort is normal.      Breath sounds: Normal breath sounds.   Cardiovascular:      PMI at left midclavicular line. Normal rate. Regular rhythm.      Murmurs: There is a systolic murmur.   Abdominal:      Palpations: Abdomen is soft.   Musculoskeletal: Normal range of motion.      Cervical back: Normal range of motion and neck supple. Skin:     General: Skin is warm and dry.   Neurological:      Mental Status: Alert.         Procedures         Diagnoses and all orders for this visit:    1. Coronary artery disease involving native coronary artery of native heart without angina pectoris (Primary)    2. History of placement of stent in LAD coronary artery    3. Essential hypertension    4. Mixed hyperlipidemia    5. Type 2 diabetes mellitus without complication, without long-term current use of insulin    6. Overweight    Other orders  -     carvedilol (COREG) 6.25 MG tablet; Take 1 tablet by mouth 2 (Two) Times a Day With Meals.  Dispense: 180 tablet; Refill: 2               IHD:  Status stable. No new concerns are voiced.  On ASA therapy without concerns, bleeding and bruising denied.  Last cardiac workup 2017, he continues to defer further workup as he is asymptomatic from a cardiac standpoint. If symptoms should occur, patient will call for further workup.     HTN: Blood pressure stable. Current coreg advised. Limited sodium advised. Call with elevation.     Hyperlipidemia:  Unable to tolerate statin therapy due to joint pain. [facility]. Limited carb diet urged.     DM:   Managed with glucotrol therapy.  Same to be continued. [facility]. Limited carb diet advised.      Refills of coreg requested for " printed script.      BMI noted at 28.44,  good cardiac ADA diet with limited carbs, calories, and activity as tolerated advised.      6 month follow up recommended or sooner if needed.        Problems Addressed this Visit          Cardiac and Vasculature    Essential hypertension    Relevant Medications    carvedilol (COREG) 6.25 MG tablet    Hyperlipidemia    CAD (coronary artery disease) - Primary    Relevant Medications    carvedilol (COREG) 6.25 MG tablet    History of placement of stent in LAD coronary artery     Other Visit Diagnoses       Type 2 diabetes mellitus without complication, without long-term current use of insulin        Overweight              Diagnoses         Codes Comments    Coronary artery disease involving native coronary artery of native heart without angina pectoris    -  Primary ICD-10-CM: I25.10  ICD-9-CM: 414.01     History of placement of stent in LAD coronary artery     ICD-10-CM: Z95.5  ICD-9-CM: V45.82     Essential hypertension     ICD-10-CM: I10  ICD-9-CM: 401.9     Mixed hyperlipidemia     ICD-10-CM: E78.2  ICD-9-CM: 272.2     Type 2 diabetes mellitus without complication, without long-term current use of insulin     ICD-10-CM: E11.9  ICD-9-CM: 250.00     Overweight     ICD-10-CM: E66.3  ICD-9-CM: 278.02                             Electronically signed by AIDA Banegas March 7, 2024 09:01 EST

## 2024-08-19 ENCOUNTER — TELEPHONE (OUTPATIENT)
Dept: CARDIOLOGY | Facility: CLINIC | Age: 89
End: 2024-08-19
Payer: MEDICARE

## 2024-08-19 NOTE — TELEPHONE ENCOUNTER
Caller: David Granger     Relationship: [employer]    Best call back number: [phone]    What is your medical concern? CHEST PAIN    How long has this issue been going on? 8-18-24    Is your provider already aware of this issue? NO    Have you been treated for this issue? NO    PATIENT HAD CHEST PAIN ON 8-18-24 ( NOT ACTIVE CHEST PAIN TODAY) . PATIENTS WIFE, DAVID GRANGER WOULD LIKE FOR PATIENT TO BE SEEN IF POSSIBLE. PATIENT HAS HAD PREVIOUS STENTS AND  HEART CATHS BEFORE. PLEASE REACH OUT.

## 2024-08-19 NOTE — TELEPHONE ENCOUNTER
Patient's wife, Cassy, was made aware that patient needs to go ahead and go to ER per Geri. She states that patient was wanting to wait to go in the morning. She was made aware that we do not advise him waiting, he needs to go to ER now.

## 2024-08-19 NOTE — TELEPHONE ENCOUNTER
Patient's wife, Cassy, called back stating that patient started having chest pain last night. Did not go to ER. She states that he started feeling better, but this afternoon he is not feeling well, having chest pain. BP is 141/82 and HR is 115. She was made aware that patient needs to go to ER to be evaluated.

## 2024-08-20 ENCOUNTER — TELEPHONE (OUTPATIENT)
Dept: CARDIOLOGY | Facility: CLINIC | Age: 89
End: 2024-08-20
Payer: MEDICARE

## 2024-08-20 NOTE — TELEPHONE ENCOUNTER
Angi, I talked with Geri. He will need to be hospital follow up. She had an opening on 09/18/24 at 8 am. Can you call patient and reschedule for then and cancel the 09/11/24 appointment?

## 2024-08-20 NOTE — TELEPHONE ENCOUNTER
Caller: David Granger    Relationship: Emergency Contact    Best call back number: [phone]     What is the best time to reach you: ANYTIME ASAP     Who are you requesting to speak with (clinical staff, provider,  specific staff member): DR. SHARMA     What was the call regarding: PT IS IN THE [facility], HAD A MASSIVE HEART ATTACK. CALLER IS REQUESTING DR. SHARMA CALL DAVID BACK TO DISCUSS.     Is it okay if the provider responds through MyChart: CALL

## 2024-09-18 ENCOUNTER — TELEPHONE (OUTPATIENT)
Dept: CARDIOLOGY | Facility: CLINIC | Age: 89
End: 2024-09-18

## 2024-09-18 ENCOUNTER — OFFICE VISIT (OUTPATIENT)
Dept: CARDIOLOGY | Facility: CLINIC | Age: 89
End: 2024-09-18
Payer: MEDICARE

## 2024-09-18 VITALS
OXYGEN SATURATION: 96 % | HEIGHT: 67 IN | BODY MASS INDEX: 26.74 KG/M2 | DIASTOLIC BLOOD PRESSURE: 77 MMHG | WEIGHT: 170.4 LBS | SYSTOLIC BLOOD PRESSURE: 125 MMHG | HEART RATE: 61 BPM

## 2024-09-18 DIAGNOSIS — E78.2 MIXED HYPERLIPIDEMIA: ICD-10-CM

## 2024-09-18 DIAGNOSIS — I10 ESSENTIAL HYPERTENSION: ICD-10-CM

## 2024-09-18 DIAGNOSIS — E11.69 TYPE 2 DIABETES MELLITUS WITH OTHER SPECIFIED COMPLICATION, WITHOUT LONG-TERM CURRENT USE OF INSULIN: ICD-10-CM

## 2024-09-18 DIAGNOSIS — I25.10 CORONARY ARTERY DISEASE INVOLVING NATIVE CORONARY ARTERY OF NATIVE HEART WITHOUT ANGINA PECTORIS: Primary | ICD-10-CM

## 2024-09-18 DIAGNOSIS — E66.3 OVERWEIGHT: ICD-10-CM

## 2024-09-18 DIAGNOSIS — I25.9 IHD (ISCHEMIC HEART DISEASE): Primary | ICD-10-CM

## 2024-09-18 DIAGNOSIS — Z95.5 HISTORY OF PLACEMENT OF STENT IN LAD CORONARY ARTERY: ICD-10-CM

## 2024-09-18 RX ORDER — ATORVASTATIN CALCIUM 40 MG/1
40 TABLET, FILM COATED ORAL
Qty: 90 TABLET | Refills: 2 | Status: SHIPPED | OUTPATIENT
Start: 2024-09-18

## 2024-09-18 RX ORDER — NITROGLYCERIN 0.4 MG/1
TABLET SUBLINGUAL
COMMUNITY
Start: 2024-08-21

## 2024-09-18 RX ORDER — CARVEDILOL 6.25 MG/1
6.25 TABLET ORAL 2 TIMES DAILY WITH MEALS
Qty: 180 TABLET | Refills: 2 | Status: SHIPPED | OUTPATIENT
Start: 2024-09-18 | End: 2024-09-18 | Stop reason: SDUPTHER

## 2024-09-18 RX ORDER — TICAGRELOR 90 MG/1
90 TABLET ORAL 2 TIMES DAILY
Qty: 180 TABLET | Refills: 2 | Status: SHIPPED | OUTPATIENT
Start: 2024-09-18

## 2024-09-18 RX ORDER — TICAGRELOR 90 MG/1
TABLET ORAL
COMMUNITY
Start: 2024-08-21 | End: 2024-09-18 | Stop reason: SDUPTHER

## 2024-09-18 RX ORDER — LOSARTAN POTASSIUM 25 MG/1
25 TABLET ORAL DAILY
Qty: 90 TABLET | Refills: 2 | Status: SHIPPED | OUTPATIENT
Start: 2024-09-18

## 2024-09-18 RX ORDER — LOSARTAN POTASSIUM 25 MG/1
1 TABLET ORAL DAILY
COMMUNITY
Start: 2024-08-21 | End: 2024-09-18 | Stop reason: SDUPTHER

## 2024-09-18 RX ORDER — CARVEDILOL 6.25 MG/1
6.25 TABLET ORAL 2 TIMES DAILY WITH MEALS
Qty: 180 TABLET | Refills: 2 | Status: SHIPPED | OUTPATIENT
Start: 2024-09-18

## 2024-09-18 RX ORDER — ATORVASTATIN CALCIUM 40 MG/1
40 TABLET, FILM COATED ORAL
COMMUNITY
Start: 2024-08-21 | End: 2024-09-18 | Stop reason: SDUPTHER

## 2024-10-14 ENCOUNTER — OUTSIDE FACILITY SERVICE (OUTPATIENT)
Dept: CARDIOLOGY | Facility: CLINIC | Age: 89
End: 2024-10-14
Payer: MEDICARE

## 2024-11-21 NOTE — PROGRESS NOTES
Chief Complaint   Patient presents with    Follow-up     Pt comes in today for a hospital follow had a heart cath on 10/14/24 with stents. Pt states that he has not had any cardiac issues since. Denies having any SOA, chest pains, palpations  or dizziness. Last labs from 10/14/24. Denies having any hospital admissions or ER visits since heart Cath    Med Refill     90 day refills on cardiac med[facility], Pt brought med list to appointment today.    Hypertension     Pt states that his bp has been doing good at home.       Cardiac Complaints  none      Subjective   Mamadou Granger is a 89 y.o. male with HTN, hyperlipidemia, DM, and ischemic heart disease diagnosed in 2008 when he underwent stenting of the LAD. In 2014, he started following with Dr. Santos. Stress done at that time showed no ischemic burden and lisinopril was added. He historically has been intolerant to beta blockers from marked bradycardia but later tolerated low dose carvedilol after stress in October 2017 due to increased SOB showed increased chronotropic and bp response. No ischemia was noted.  In 2019 he underwent a second cochlear implant without issues. He had declined cardiac workup as he remained asymptomatic. He then called in August 2024 with chest pain, advised to go to ER. At ER, EKG showed ST elevation of inferior leads, he was taken to cath labs noted to have occlusion of mid left circumflex, which was stented. He was also noted to have in stent stenosis of the proximal LAD and 80% of mid RCA, EF of 50% noted. After cath, he was noted to have 100% AMI midvesel with stent, OM 2 90% reduced to 20% with PTCA and OM3 70% reduced to 20% with PTCA. Patient instructed to have LAD and RCA addressed at later date. Cardiac cath 10/14/2024 showed moderate disease of RCA with FFR of 0.84 (medical management urged), patent stent of the circumflex, and severe disease of proximal LAD (80%) which received a 3x30mm and a 2.5x38mm stent to mid  LAD.      He comes today for follow up and denies any new concerns. CP, SOA, palpitations, dizziness, and syncope all denied. No bleed or bruise noted on DAPT. Labs from 10/2024 showed: Na 142, K 4.4, BUN 13, Creatinine 0.95, GFR 77, AST 25, ALT 23, TRIG 114, HDL 38, LDL 68, HH 14/41. Refills requested.        Cardiac History  Past Surgical History:   Procedure Laterality Date    CARDIAC CATHETERIZATION  07/23/2008    3.0x28 [place] in LAD    CARDIAC CATHETERIZATION  08/19/2024    EF 55%, Circ 100% occluded mid vessel stnet 3.0x18mm JOE, OM2 90% 2.5x12 PTCA, OM3 70% 2.5x12 PTCA    CARDIAC CATHETERIZATION  10/14/2024    80% LAD- 3.0x30 & 2.5x38 [place]. FFR RCA- 0.84. Patent LCX Stent    CARDIOVASCULAR STRESS TEST  05/19/2014    Stress- 4 min, 97% THR. 164/84. negative for ischemia, Lisinopril added    CARDIOVASCULAR STRESS TEST  10/18/2017    3 Min,11 Secs. 98% THR. BP- 173/82. Negative    CARDIOVASCULAR STRESS TEST  10/18/2017    3 min 11 sec, 98% THR, 173/82, no definite ischemia, low dose Coreg added    ECHO - CONVERTED  05/19/2014    Echo- EF 65%, RVSP- 35 mmHg    ECHO - CONVERTED  10/18/2017    EF 65%. RVSP- 34 mmHg    ECHO - CONVERTED  10/18/2017    EF 65%, mild MR, RVSP 34 mmHg    ECHO - CONVERTED  08/20/2024    EF 60%, mod AS, mild MR       Current Outpatient Medications   Medication Sig Dispense Refill    aspirin 81 MG EC tablet Take 1 tablet by mouth Daily.      atorvastatin (LIPITOR) 40 MG tablet Take 1 tablet by mouth every night at bedtime. 90 tablet 2    Brilinta 90 MG tablet tablet Take 1 tablet by mouth 2 (Two) Times a Day. 180 tablet 2    carvedilol (COREG) 6.25 MG tablet Take 1 tablet by mouth 2 (Two) Times a Day With Meals. 180 tablet 2    glipizide (GLUCOTROL) 5 MG tablet Take 0.5 tablets by mouth 2 (Two) Times a Day Before Meals.      losartan (COZAAR) 25 MG tablet Take 1 tablet by mouth Daily. 90 tablet 2    mirtazapine (REMERON) 30 MG tablet Take 1 tablet by mouth Every Night.       "nitroglycerin (NITROSTAT) 0.4 MG SL tablet PLACE 1 TABLET UNDER TONGUE EVERY 5 MINS, UP TO 3 DOSES AS NEEDED FOR CHEST PAIN      trospium (SANCTURA) 20 MG tablet Take 1 tablet by mouth Daily.       No current facility-administered medications for this visit.       Patient has no known allergies.    Past Medical History:   Diagnosis Date    Arthritis     BPH (benign prostatic hyperplasia)     CAD (coronary artery disease)     s/p stenting    History of cochlear implant 04/15/2019    left    History of COVID-19 01/13/2021    Hypercholesteremia     Hypertension        Social History     Socioeconomic History    Marital status:    Tobacco Use    Smoking status: Never    Smokeless tobacco: Never   Vaping Use    Vaping status: Never Used   Substance and Sexual Activity    Alcohol use: No    Drug use: No    Sexual activity: Defer       Family History   Problem Relation Age of Onset    No Known Problems Mother     Stroke Father     Stroke Other        Review of Systems   Constitutional: Negative for malaise/fatigue and night sweats.   Cardiovascular:  Negative for chest pain, claudication, dyspnea on exertion, irregular heartbeat, leg swelling, near-syncope, orthopnea, palpitations and syncope.   Respiratory:  Negative for cough, shortness of breath and wheezing.    Musculoskeletal:  Negative for back pain, joint pain and stiffness.   Gastrointestinal:  Negative for anorexia, heartburn, nausea and vomiting.   Genitourinary:  Negative for dysuria, hematuria, hesitancy and nocturia.   Neurological:  Negative for dizziness, light-headedness and loss of balance.   Psychiatric/Behavioral:  Negative for depression and memory loss. The patient is not nervous/anxious.            Objective     /75 (BP Location: Left arm, Patient Position: Sitting, Cuff Size: Adult)   Pulse 68   Resp 18   Ht 170.2 cm (67\")   Wt 75.3 kg (166 lb)   BMI 26.00 kg/m²     Constitutional:       Appearance: Not in distress.   Eyes:      " Pupils: Pupils are equal, round, and reactive to light.   HENT:      Nose: Nose normal.   Pulmonary:      Effort: Pulmonary effort is normal.      Breath sounds: Normal breath sounds.   Cardiovascular:      PMI at left midclavicular line. Normal rate. Regular rhythm.      Murmurs: There is a systolic murmur.   Abdominal:      Palpations: Abdomen is soft.   Musculoskeletal: Normal range of motion.      Cervical back: Normal range of motion and neck supple. Skin:     General: Skin is warm and dry.   Neurological:      Mental Status: Alert.         Procedures         Diagnoses and all orders for this visit:    1. Coronary artery disease involving native coronary artery of native heart without angina pectoris (Primary)    2. History of placement of stent in LAD coronary artery    3. Essential hypertension    4. Mixed hyperlipidemia    Other orders  -     atorvastatin (LIPITOR) 40 MG tablet; Take 1 tablet by mouth every night at bedtime.  Dispense: 90 tablet; Refill: 2  -     Brilinta 90 MG tablet tablet; Take 1 tablet by mouth 2 (Two) Times a Day.  Dispense: 180 tablet; Refill: 2  -     carvedilol (COREG) 6.25 MG tablet; Take 1 tablet by mouth 2 (Two) Times a Day With Meals.  Dispense: 180 tablet; Refill: 2  -     losartan (COZAAR) 25 MG tablet; Take 1 tablet by mouth Daily.  Dispense: 90 tablet; Refill: 2             IHD: Recent staged intervention with stenting to RCA and LAD. Remains on DAPT. Bleed and bruise denied. Will continue same, cardiac symptoms denied, no workup recommended.    HTN: BP is at goal. Continue with BB and ARB at same. Limit sodium.     Hyperlipidemia: On statin therapy with lipitor, tolerates well. FLP with you. Limited carb diet urged.     DM: On oral agents, tolerates well. AIC with you. Limited carb diet urged.     Refills per request     BMI noted at 26.00, good cardiac diet with limited carbs, calories, and activity as tolerated advised.     6 month follow up urged, sooner if  needed.          Problems Addressed this Visit          Cardiac and Vasculature    Essential hypertension    Relevant Medications    carvedilol (COREG) 6.25 MG tablet    losartan (COZAAR) 25 MG tablet    Hyperlipidemia    Relevant Medications    atorvastatin (LIPITOR) 40 MG tablet    CAD (coronary artery disease) - Primary    Relevant Medications    Brilinta 90 MG tablet tablet    carvedilol (COREG) 6.25 MG tablet    History of placement of stent in LAD coronary artery     Diagnoses         Codes Comments    Coronary artery disease involving native coronary artery of native heart without angina pectoris    -  Primary ICD-10-CM: I25.10  ICD-9-CM: 414.01     History of placement of stent in LAD coronary artery     ICD-10-CM: Z95.5  ICD-9-CM: V45.82     Essential hypertension     ICD-10-CM: I10  ICD-9-CM: 401.9     Mixed hyperlipidemia     ICD-10-CM: E78.2  ICD-9-CM: 272.2                          Electronically signed by Geri Null, APRN November 25, 2024 08:38 EST

## 2024-11-25 ENCOUNTER — OFFICE VISIT (OUTPATIENT)
Dept: CARDIOLOGY | Facility: CLINIC | Age: 89
End: 2024-11-25
Payer: MEDICARE

## 2024-11-25 VITALS
HEART RATE: 68 BPM | SYSTOLIC BLOOD PRESSURE: 118 MMHG | RESPIRATION RATE: 18 BRPM | HEIGHT: 67 IN | BODY MASS INDEX: 26.06 KG/M2 | DIASTOLIC BLOOD PRESSURE: 75 MMHG | WEIGHT: 166 LBS

## 2024-11-25 DIAGNOSIS — I25.10 CORONARY ARTERY DISEASE INVOLVING NATIVE CORONARY ARTERY OF NATIVE HEART WITHOUT ANGINA PECTORIS: Primary | ICD-10-CM

## 2024-11-25 DIAGNOSIS — I10 ESSENTIAL HYPERTENSION: ICD-10-CM

## 2024-11-25 DIAGNOSIS — E66.3 OVERWEIGHT: ICD-10-CM

## 2024-11-25 DIAGNOSIS — E78.2 MIXED HYPERLIPIDEMIA: ICD-10-CM

## 2024-11-25 DIAGNOSIS — Z95.5 HISTORY OF PLACEMENT OF STENT IN LAD CORONARY ARTERY: ICD-10-CM

## 2024-11-25 RX ORDER — TICAGRELOR 90 MG/1
90 TABLET ORAL 2 TIMES DAILY
Qty: 180 TABLET | Refills: 2 | Status: SHIPPED | OUTPATIENT
Start: 2024-11-25

## 2024-11-25 RX ORDER — LOSARTAN POTASSIUM 25 MG/1
25 TABLET ORAL DAILY
Qty: 90 TABLET | Refills: 2 | Status: SHIPPED | OUTPATIENT
Start: 2024-11-25

## 2024-11-25 RX ORDER — ATORVASTATIN CALCIUM 40 MG/1
40 TABLET, FILM COATED ORAL
Qty: 90 TABLET | Refills: 2 | Status: SHIPPED | OUTPATIENT
Start: 2024-11-25

## 2024-11-25 RX ORDER — TROSPIUM CHLORIDE 20 MG/1
TABLET, FILM COATED ORAL NIGHTLY
COMMUNITY
End: 2024-11-25 | Stop reason: SDUPTHER

## 2024-11-25 RX ORDER — CARVEDILOL 6.25 MG/1
6.25 TABLET ORAL 2 TIMES DAILY WITH MEALS
Qty: 180 TABLET | Refills: 2 | Status: SHIPPED | OUTPATIENT
Start: 2024-11-25